# Patient Record
Sex: MALE | Race: WHITE | Employment: FULL TIME | URBAN - METROPOLITAN AREA
[De-identification: names, ages, dates, MRNs, and addresses within clinical notes are randomized per-mention and may not be internally consistent; named-entity substitution may affect disease eponyms.]

---

## 2017-01-05 ENCOUNTER — TELEPHONE (OUTPATIENT)
Dept: INTERNAL MEDICINE CLINIC | Age: 52
End: 2017-01-05

## 2017-01-05 NOTE — TELEPHONE ENCOUNTER
Ayla Winter called from Dr. Devi Patterson office and needs to change codes. She says she needs 66051 - 1 unit and 63349 4 units.   She would like a call back at [phone]

## 2017-01-16 NOTE — TELEPHONE ENCOUNTER
Luz Elena added CPT code 79924 with 1 visit retro 'd with effective starting date of 01/03/17 - end date 07/02/17   Received from [address] [place] [address] via fax on 01/13/17

## 2017-05-24 ENCOUNTER — TELEPHONE (OUTPATIENT)
Dept: INTERNAL MEDICINE CLINIC | Age: 52
End: 2017-05-24

## 2017-05-24 DIAGNOSIS — E29.1 MALE HYPOGONADISM: Primary | ICD-10-CM

## 2017-05-24 NOTE — TELEPHONE ENCOUNTER
----- Message from Francisca Hawley sent at 5/24/2017  1:24 PM EDT -----  Regarding: Dr. Noemi Shore  Pt would like a referral sent to Dr Marci Zimmerman for testosterone check [phone]. Appt set for 6/22/17. Best contact number is [phone].

## 2017-05-25 NOTE — TELEPHONE ENCOUNTER
[facility] [address]    Dr Farias Section     [address] [address] Endocrinology   Osteoporosis      [facility] [address]           p [phone]  f [phone]    Dx E29.1 secondary male hypogonadism         [id number]

## 2017-05-26 ENCOUNTER — TELEPHONE (OUTPATIENT)
Dept: INTERNAL MEDICINE CLINIC | Age: 52
End: 2017-05-26

## 2017-05-26 NOTE — TELEPHONE ENCOUNTER
Referral obtained and faxed to Dr Rich Casas' office at [phone]. Auth # [id number]  6 visits  5/25/17-5/25/18.

## 2017-09-13 DIAGNOSIS — F51.01 PRIMARY INSOMNIA: ICD-10-CM

## 2017-09-13 DIAGNOSIS — N52.9 ERECTILE DYSFUNCTION, UNSPECIFIED ERECTILE DYSFUNCTION TYPE: ICD-10-CM

## 2017-09-13 RX ORDER — SYRINGE-NEEDLE,INSULIN,0.5 ML 30 G X1/2"
SYRINGE, EMPTY DISPOSABLE MISCELLANEOUS
Qty: 30 SYRINGE | Refills: 3 | Status: SHIPPED | OUTPATIENT
Start: 2017-09-13 | End: 2018-02-27 | Stop reason: SDUPTHER

## 2017-09-13 RX ORDER — ZOLPIDEM TARTRATE 10 MG/1
10 TABLET ORAL
Qty: 90 TAB | Refills: 1 | Status: SHIPPED | OUTPATIENT
Start: 2017-09-13 | End: 2017-11-30

## 2017-09-27 RX ORDER — TRAZODONE HYDROCHLORIDE 50 MG/1
TABLET ORAL
Qty: 90 TAB | Refills: 1 | Status: SHIPPED | OUTPATIENT
Start: 2017-09-27 | End: 2018-03-26 | Stop reason: SDUPTHER

## 2017-09-27 RX ORDER — SIMVASTATIN 20 MG/1
TABLET, FILM COATED ORAL
Qty: 90 TAB | Refills: 1 | Status: SHIPPED | OUTPATIENT
Start: 2017-09-27 | End: 2017-11-30 | Stop reason: SINTOL

## 2017-10-19 ENCOUNTER — TELEPHONE (OUTPATIENT)
Dept: INTERNAL MEDICINE CLINIC | Age: 52
End: 2017-10-19

## 2017-10-19 NOTE — TELEPHONE ENCOUNTER
alprostadil (CAVERJECT IMPULSE) 20 mcg kit  Pleases call AL the Pharmacist at [facility] [phone] Press [phone] regarding this medication he was talking to fast for me to under stand what he was saying regarding this medication

## 2017-10-20 ENCOUNTER — DOCUMENTATION ONLY (OUTPATIENT)
Dept: INTERNAL MEDICINE CLINIC | Age: 52
End: 2017-10-20

## 2017-10-20 NOTE — PROGRESS NOTES
Pt picked up 1 rx for ambien, 1 rx for cavaject impulse, and 1 rx for insluin syringe ultra fine on 9/14/17

## 2017-11-13 ENCOUNTER — TELEPHONE (OUTPATIENT)
Dept: INTERNAL MEDICINE CLINIC | Age: 52
End: 2017-11-13

## 2017-11-13 DIAGNOSIS — Z01.00 EXAMINATION OF EYES AND VISION: Primary | ICD-10-CM

## 2017-11-13 DIAGNOSIS — H53.9 VISUAL CHANGES: ICD-10-CM

## 2017-11-22 RX ORDER — TELMISARTAN 40 MG/1
TABLET ORAL
Qty: 90 TAB | Refills: 3 | Status: SHIPPED | OUTPATIENT
Start: 2017-11-22 | End: 2018-11-17 | Stop reason: SDUPTHER

## 2017-11-30 ENCOUNTER — OFFICE VISIT (OUTPATIENT)
Dept: INTERNAL MEDICINE CLINIC | Age: 52
End: 2017-11-30

## 2017-11-30 VITALS
SYSTOLIC BLOOD PRESSURE: 120 MMHG | RESPIRATION RATE: 12 BRPM | BODY MASS INDEX: 31.56 KG/M2 | HEIGHT: 72 IN | WEIGHT: 233 LBS | TEMPERATURE: 97.9 F | DIASTOLIC BLOOD PRESSURE: 82 MMHG | HEART RATE: 73 BPM

## 2017-11-30 DIAGNOSIS — H01.00A BLEPHARITIS OF UPPER AND LOWER EYELIDS OF BOTH EYES, UNSPECIFIED TYPE: ICD-10-CM

## 2017-11-30 DIAGNOSIS — E78.5 HYPERLIPIDEMIA WITH TARGET LDL LESS THAN 130: ICD-10-CM

## 2017-11-30 DIAGNOSIS — I10 ESSENTIAL HYPERTENSION: ICD-10-CM

## 2017-11-30 DIAGNOSIS — Z23 ENCOUNTER FOR IMMUNIZATION: ICD-10-CM

## 2017-11-30 DIAGNOSIS — F43.10 PTSD (POST-TRAUMATIC STRESS DISORDER): ICD-10-CM

## 2017-11-30 DIAGNOSIS — H01.00B BLEPHARITIS OF UPPER AND LOWER EYELIDS OF BOTH EYES, UNSPECIFIED TYPE: ICD-10-CM

## 2017-11-30 DIAGNOSIS — I48.92 ATRIAL FLUTTER, UNSPECIFIED TYPE (HCC): ICD-10-CM

## 2017-11-30 DIAGNOSIS — Z00.00 WELL ADULT EXAM: Primary | ICD-10-CM

## 2017-11-30 RX ORDER — ESZOPICLONE 3 MG/1
3 TABLET, FILM COATED ORAL
Qty: 30 TAB | Refills: 0 | Status: SHIPPED | OUTPATIENT
Start: 2017-11-30 | End: 2018-07-16 | Stop reason: ALTCHOICE

## 2017-11-30 RX ORDER — BACITRACIN 500 [USP'U]/G
OINTMENT OPHTHALMIC 3 TIMES DAILY
COMMUNITY
Start: 2017-11-29 | End: 2018-07-16 | Stop reason: ALTCHOICE

## 2017-11-30 NOTE — PROGRESS NOTES
Joleen Muniz is a 47 y.o. male  Presenting for his annual checkup and health maintenance review and follow-up    Reports right lower buttock pain for 1 month. Lisa Lopez on it. No leg weakness  Saw Dr. Samira Burroughs for ELIZABETH. Using dental appliance  Seeing Dr. Andressa Shabazz for low T  Seeing cardiology for atrial flutter  Saw Dr. Rashida Rivas in optho and was dx with blepharitis and is using ointtment  Still reports some severe anxiety prior to going to sleep. Taking [place] and trazodone    [place]    Exercise: moderately active  Diet: generally follows a low fat low cholesterol diet  Health Maintenance   Topic Date Due    DTaP/Tdap/Td series (2 - Td) 10/01/2020    COLONOSCOPY  07/11/2026    Influenza Age 5 to Adult  Completed     Health Maintenance reviewed  Last digital rectal exam:  2015  Lab Results   Component Value Date/Time    Prostate Specific Ag 0.8 11/02/2016 12:09 PM    Prostate Specific Ag 0.7 10/24/2014 12:17 PM    Prostate Specific Ag 0.7 11/18/2013 09:29 AM    Prostate Specific Ag 0.4 06/08/2010 03:58 PM    Prostate Specific Ag 0.5 11/06/2009 08:31 AM    Prostate Specific Ag 0.5 07/14/2009 10:38 AM       Vaccinations reviewed  Immunization History   Administered Date(s) Administered    Influenza Vaccine 11/01/2012, 09/01/2013, 11/01/2015, 10/10/2016, 11/28/2017    Pneumococcal Vaccine (Unspecified Type) 06/30/2013    TDAP Vaccine 10/01/2010       Past Medical History:   Diagnosis Date    Atrial flutter ([address]     saw Dr. Orlando Moon. EP Ablation 7/30/13    Blepharitis of upper and lower eyelids of both eyes 11/30/2017    Dr. Rashida Rivas    Chronic right hip pain     DDD (degenerative disc disease), lumbar 6/2006    [employer] [place] combat injury.   MRI 5/2008 [facility] ED (erectile dysfunction)     Hearing loss, sensorineural     HTN (hypertension) 2006    Hyperlipidemia LDL goal < 130 2006    Insomnia     Lumbar disc disease with radiculopathy 12/2010    radiculopathy  ELIZABETH (obstructive sleep apnea) 4/09    Ft Dr. Newton Yan, CPAP    PTSD (post-traumatic stress disorder)     sees counselor    RLS (restless legs syndrome) 4/09    sleep only    Rosacea     Secondary male hypogonadism     MRI normal 8/2009. Dr. Mazin Flores      has a past surgical history that includes hernia repair; afib ablation (07/30/2013); and colonoscopy (N/A, 7/11/2016). Viagra [sildenafil]   Current Outpatient Prescriptions   Medication Sig    bacitracin ophthalmic ointment     MICARDIS 40 mg tablet TAKE 1 TABLET DAILY    traZODone (DESYREL) 50 mg tablet TAKE 1 TABLET NIGHTLY FOR INSOMNIA    zolpidem (AMBIEN) 10 mg tablet Take 1 Tab by mouth nightly as needed for Sleep.  alprostadil (CAVERJECT IMPULSE) 20 mcg kit 20 mcg by IntraCAVernosal route daily as needed. 3 month supply    Insulin Syringe-Needle U-100 (BD INSULIN SYRINGE ULTRA-FINE) 1/2 mL 30 gauge x 1/2\" syrg FOR USE WITH CAVERJET AS IN STRUCTCED BY DOCTOR    BD LUER-MARTHA SYRINGE 3 mL 21 x 1\" syrg     metoprolol (LOPRESSOR) 25 mg tablet Take 1 Tab by mouth two (2) times a day.  simvastatin (ZOCOR) 20 mg tablet Take 1 tablet by mouth nightly.  ASPIRIN (ASPIR-81 PO) Take  by mouth.  testosterone cypionate (DEPOTESTOTERONE CYPIONATE) 200 mg/mL injection by IntraMUSCular route once. Every 10 days    alprostadil (CAVERJECT) 20 mcg solr 20 mcg by IntraCAVernosal route daily as needed. Dispense with bacteriostatic water NDC [id number]    simvastatin (ZOCOR) 20 mg tablet TAKE 1 TABLET NIGHTLY     No current facility-administered medications for this visit. SOCIAL HX:  reports that he has never smoked. He has never used smokeless tobacco. He reports that he does not drink alcohol or use illicit drugs. FAMILY HX: family history includes Hypertension in his father. There is no history of Diabetes, Cancer, or Heart Disease.     Review of Systems - History obtained from the patient  General ROS: negative for - night sweats, weight gain or weight loss  Cardiovascular ROS: no chest pain, dyspnea on exertion, edema    Physical exam  Blood pressure 120/82, pulse 73, temperature 97.9 °F (36.6 °C), temperature source Oral, resp. rate 12, height 6' (1.829 m), weight 233 lb (105.7 kg). Wt Readings from Last 3 Encounters:   11/30/17 233 lb (105.7 kg)   11/02/16 240 lb (108.9 kg)   07/11/16 235 lb (106.6 kg)     He appears well, alert and oriented x 3, pleasant and cooperative. Vitals as noted. No rashes or significant lesions. Neck supple and free of adenopathy, or masses. No thyromegaly or carotid bruits. Cranial nerves normal. Lungs are clear to auscultation. Heart sounds are normal with no murmurs, clicks, gallops or rubs. Abdomen is soft, non- tender, with no masses or organomegaly. Extremities, peripheral pulses and reflexes are normal.  . RECTAL/PROSTATE EXAM: smooth and symmetric without nodules or tenderness. Skin is without rashes or suspicious lesions. Assessment and Plan:    1. Well adult exam  Overall doing fairly well. Check fasting labs. Prostate exam is normal.    Up-to-date on immunizations but will give hepatitis A and B vaccines because of upcoming travel  Repeat hepatitis A B in 1 month and then in 6 months. .    2. Blepharitis of upper and lower eyelids of both eyes, unspecified type  Using ointment per  ophthalmology    3. Hyperlipidemia with target LDL less than 130  Likely controlled with simvastatin    4. Essential hypertension  Controlled on current regimen. Continue current medications as written in chart    5. Atrial flutter, unspecified type [facility])  Currently asymptomatic  No scheduled follow-up with cardiology unless symptoms warrant. Insomnia and PTSD  Taking trazodone 50 mg and Ambien 10 mg. He has severe panic at night before going to bed. Will transition over to trazodone plus Lunesta for now. Could consider increasing trazodone as another option.   We will try to avoid doxepin because of history of arrhythmia. Also will try to avoid a benzodiazepine if possible, but could consider clonazepam to replace Lunesta and Ambien.       The patient is asked to make an attempt to improve diet and exercise patterns  Avoid tobacco products, excessive alcohol    Return for yearly wellness visits

## 2017-11-30 NOTE — PROGRESS NOTES
Presents for a PE. He is fasting. Will be going to [facility] and [facility] in the spring. Wonders about vaccination. Saw Dr Cornelio Campuzano yesterday for his eyes. On ointment. Discuss some depression. Some anxiety before bed.

## 2017-11-30 NOTE — MR AVS SNAPSHOT
Visit Information Date & Time Provider Department Dept. Phone Encounter #  
 11/30/2017 10:00 AM Thanh Hernandes MD [facility] [address] [id number] Upcoming Health Maintenance Date Due DTaP/Tdap/Td series (2 - Td) 10/1/2020 COLONOSCOPY 7/11/2026 Allergies as of 11/30/2017  Review Complete On: 11/30/2017 By: Thanh Hernandes MD  
  
 Severity Noted Reaction Type Reactions Viagra [Sildenafil]  07/14/2009    Other (comments) Visual changes Current Immunizations  Reviewed on 11/30/2017 Name Date Hep A and Hep B Vaccine  Incomplete Influenza Vaccine 11/28/2017, 10/10/2016, 11/1/2015, 9/1/2013, 11/1/2012 Pneumococcal Vaccine (Unspecified Type) 6/30/2013 TDAP Vaccine 10/1/2010 Reviewed by Melanie Smith on 11/30/2017 at 10:23 AM  
 Reviewed by Thanh Hernandes MD on 11/30/2017 at 10:45 AM  
You Were Diagnosed With   
  
 Codes Comments Well adult exam    -  Primary ICD-10-CM: Z00.00 ICD-9-CM: V70.0 Encounter for immunization     ICD-10-CM: Y78 ICD-9-CM: V03.89 Blepharitis of upper and lower eyelids of both eyes, unspecified type     ICD-10-CM: H01.001, H01.005, H01.004, H01.002 ICD-9-CM: 373.00 Hyperlipidemia with target LDL less than 130     ICD-10-CM: E78.5 ICD-9-CM: 272.4 Essential hypertension     ICD-10-CM: I10 
ICD-9-CM: 401.9 Atrial flutter, unspecified type ([facility] [address]     ICD-10-CM: I48.92 
ICD-9-CM: 427.32   
 PTSD (post-traumatic stress disorder)     ICD-10-CM: F43.10 ICD-9-CM: 309.81 Vitals BP Pulse Temp Resp Height(growth percentile) Weight(growth percentile) 120/82 (BP 1 Location: Left arm, BP Patient Position: Sitting) 73 97.9 °F (36.6 °C) (Oral) 12 6' (1.829 m) 233 lb (105.7 kg) BMI Smoking Status 31.6 kg/m2 Never Smoker Vitals History BMI and BSA Data Body Mass Index Body Surface Area  
 31.6 kg/m 2 2.32 m 2 Preferred Pharmacy Pharmacy Name Phone [address], [facility] [phone] Your Updated Medication List  
  
   
This list is accurate as of: 11/30/17 11:02 AM.  Always use your most recent med list.  
  
  
  
  
 alprostadil 20 mcg Kit Commonly known as:  CAVERJECT IMPULSE  
20 mcg by IntraCAVernosal route daily as needed. 3 month supply ASPIR-81 PO Take  by mouth.  
  
 bacitracin ophthalmic ointment BD LUER-MARTHA SYRINGE 3 mL 21 gauge x 1\" Syrg Generic drug:  Syringe with Needle (Disp)  
  
 eszopiclone 3 mg tablet Commonly known as:  Chase Buoy Take 1 Tab by mouth nightly. Max Daily Amount: 3 mg. Benny Hyde Insulin Syringe-Needle U-100 1/2 mL 30 gauge x 1/2\" Syrg Commonly known as:  BD INSULIN SYRINGE ULTRA-FINE  
FOR USE WITH CAVERJET AS IN STRUCTCED BY DOCTOR  
  
 metoprolol tartrate 25 mg tablet Commonly known as:  LOPRESSOR Take 1 Tab by mouth two (2) times a day. MICARDIS 40 mg tablet Generic drug:  telmisartan TAKE 1 TABLET DAILY  
  
 simvastatin 20 mg tablet Commonly known as:  ZOCOR Take 1 tablet by mouth nightly. testosterone cypionate 200 mg/mL injection Commonly known as:  DEPOTESTOTERONE CYPIONATE  
by IntraMUSCular route once. Every 10 days  
  
 traZODone 50 mg tablet Commonly known as:  DESYREL  
TAKE 1 TABLET NIGHTLY FOR INSOMNIA Prescriptions Printed Refills  
 eszopiclone (LUNESTA) 3 mg tablet 0 Sig: Take 1 Tab by mouth nightly. Max Daily Amount: 3 mg. Benny Hyde Class: Print Route: Oral  
  
We Performed the Following HEMOGLOBIN A1C WITH EAG [46991 CPT(R)] HEPATITIS A AND HEPATITIS B (HEPA-HEPB), ADULT DOSAGE, IM [88086 CPT(R)] LIPID PANEL [91484 CPT(R)] METABOLIC PANEL, COMPREHENSIVE [37200 CPT(R)] RI IMMUNIZ ADMIN,1 SINGLE/COMB VAC/TOXOID T8199102 CPT(R)] PSA W/ REFLX FREE PSA [86154 CPT(R)] Introducing \[facility]\"" & HEALTH SERVICES! Dear Renetta Clemons: Thank you for requesting a Vaybee account. Our records indicate that you already have an active Vaybee account. You can access your account anytime at [email or web address] Payment [email or web address]. [email or web address]/[email or web address] Payment Services Did you know that you can access your hospital and ER discharge instructions at any time in Vaybee? You can also review all of your test results from your hospital stay or ER visit. Additional Information If you have questions, please visit the Frequently Asked Questions section of the Vaybee website at [email or web address] Payment Services. [email or web address]/[email or web address] Payment Services/. Remember, Vaybee is NOT to be used for urgent needs. For medical emergencies, dial 911. Now available from your iPhone and Android! Please provide this summary of care documentation to your next provider. Your primary care clinician is listed as Deepak Chapa. If you have any questions after today's visit, please call [phone].

## 2017-12-01 LAB
ALBUMIN SERPL-MCNC: 4.5 G/DL (ref 3.5–5.5)
ALBUMIN/GLOB SERPL: 2.1 {RATIO} (ref 1.2–2.2)
ALP SERPL-CCNC: 32 IU/L (ref 39–117)
ALT SERPL-CCNC: 24 IU/L (ref 0–44)
AST SERPL-CCNC: 14 IU/L (ref 0–40)
BILIRUB SERPL-MCNC: 0.9 MG/DL (ref 0–1.2)
BUN SERPL-MCNC: 12 MG/DL (ref 6–24)
BUN/CREAT SERPL: 11 (ref 9–20)
CALCIUM SERPL-MCNC: 9.1 MG/DL (ref 8.7–10.2)
CHLORIDE SERPL-SCNC: 101 MMOL/L (ref 96–106)
CHOLEST SERPL-MCNC: 195 MG/DL (ref 100–199)
CO2 SERPL-SCNC: 24 MMOL/L (ref 18–29)
CREAT SERPL-MCNC: 1.08 MG/DL (ref 0.76–1.27)
EST. AVERAGE GLUCOSE BLD GHB EST-MCNC: 103 MG/DL
GFR SERPLBLD CREATININE-BSD FMLA CKD-EPI: 79 ML/MIN/1.73
GFR SERPLBLD CREATININE-BSD FMLA CKD-EPI: 91 ML/MIN/1.73
GLOBULIN SER CALC-MCNC: 2.1 G/DL (ref 1.5–4.5)
GLUCOSE SERPL-MCNC: 82 MG/DL (ref 65–99)
HBA1C MFR BLD: 5.2 % (ref 4.8–5.6)
HDLC SERPL-MCNC: 36 MG/DL
INTERPRETATION, 910389: NORMAL
LDLC SERPL CALC-MCNC: 143 MG/DL (ref 0–99)
POTASSIUM SERPL-SCNC: 5 MMOL/L (ref 3.5–5.2)
PROT SERPL-MCNC: 6.6 G/DL (ref 6–8.5)
PSA SERPL-MCNC: 0.6 NG/ML (ref 0–4)
REFLEX CRITERIA: NORMAL
SODIUM SERPL-SCNC: 139 MMOL/L (ref 134–144)
TRIGL SERPL-MCNC: 82 MG/DL (ref 0–149)
VLDLC SERPL CALC-MCNC: 16 MG/DL (ref 5–40)

## 2018-03-26 DIAGNOSIS — E78.5 HYPERLIPIDEMIA WITH TARGET LOW DENSITY LIPOPROTEIN (LDL) CHOLESTEROL LESS THAN 130 MG/DL: ICD-10-CM

## 2018-03-26 RX ORDER — TRAZODONE HYDROCHLORIDE 50 MG/1
TABLET ORAL
Qty: 90 TAB | Refills: 1 | Status: SHIPPED | OUTPATIENT
Start: 2018-03-26 | End: 2018-09-22 | Stop reason: SDUPTHER

## 2018-03-26 RX ORDER — SIMVASTATIN 20 MG/1
TABLET, FILM COATED ORAL
Qty: 90 TAB | Refills: 1 | Status: SHIPPED | OUTPATIENT
Start: 2018-03-26 | End: 2018-09-22 | Stop reason: SDUPTHER

## 2018-07-13 ENCOUNTER — TELEPHONE (OUTPATIENT)
Dept: INTERNAL MEDICINE CLINIC | Age: 53
End: 2018-07-13

## 2018-07-13 NOTE — TELEPHONE ENCOUNTER
Dr Lee Hallmark Stones  needs to see  [facility] next week for ? Gall Stones.  No [phone]  Would like a call back today or Monday Morning

## 2018-07-16 ENCOUNTER — OFFICE VISIT (OUTPATIENT)
Dept: INTERNAL MEDICINE CLINIC | Age: 53
End: 2018-07-16

## 2018-07-16 ENCOUNTER — HOSPITAL ENCOUNTER (OUTPATIENT)
Dept: ULTRASOUND IMAGING | Age: 53
Discharge: HOME | End: 2018-07-16
Attending: INTERNAL MEDICINE
Payer: OTHER GOVERNMENT

## 2018-07-16 VITALS
SYSTOLIC BLOOD PRESSURE: 124 MMHG | TEMPERATURE: 97.5 F | DIASTOLIC BLOOD PRESSURE: 89 MMHG | RESPIRATION RATE: 18 BRPM | HEIGHT: 72 IN | OXYGEN SATURATION: 97 % | BODY MASS INDEX: 31.02 KG/M2 | WEIGHT: 229 LBS | HEART RATE: 75 BPM

## 2018-07-16 DIAGNOSIS — F43.10 PTSD (POST-TRAUMATIC STRESS DISORDER): ICD-10-CM

## 2018-07-16 DIAGNOSIS — G47.00 INSOMNIA, UNSPECIFIED TYPE: ICD-10-CM

## 2018-07-16 DIAGNOSIS — R82.998 DARK URINE: ICD-10-CM

## 2018-07-16 DIAGNOSIS — R19.5 ACHOLIC STOOL: ICD-10-CM

## 2018-07-16 DIAGNOSIS — R10.13 EPIGASTRIC PAIN: ICD-10-CM

## 2018-07-16 DIAGNOSIS — R10.11 RUQ ABDOMINAL PAIN: Primary | ICD-10-CM

## 2018-07-16 DIAGNOSIS — R10.11 RUQ ABDOMINAL PAIN: ICD-10-CM

## 2018-07-16 DIAGNOSIS — I10 ESSENTIAL HYPERTENSION: ICD-10-CM

## 2018-07-16 DIAGNOSIS — I48.92 ATRIAL FLUTTER, UNSPECIFIED TYPE (HCC): ICD-10-CM

## 2018-07-16 PROCEDURE — 76705 ECHO EXAM OF ABDOMEN: CPT

## 2018-07-16 RX ORDER — ZOLPIDEM TARTRATE 10 MG/1
10 TABLET ORAL
Qty: 90 TAB | Refills: 1 | Status: SHIPPED | OUTPATIENT
Start: 2018-07-16 | End: 2019-06-25 | Stop reason: SDUPTHER

## 2018-07-16 RX ORDER — ZOLPIDEM TARTRATE 10 MG/1
10 TABLET ORAL
Qty: 30 TAB | Refills: 0 | Status: SHIPPED | OUTPATIENT
Start: 2018-07-16 | End: 2018-07-16 | Stop reason: SDUPTHER

## 2018-07-16 RX ORDER — ZOLPIDEM TARTRATE 10 MG/1
10 TABLET ORAL
COMMUNITY
End: 2018-07-16 | Stop reason: SDUPTHER

## 2018-07-16 NOTE — MR AVS SNAPSHOT
Jose Carissa 
 
 
 [address] [facility] [address] [facility] 
[phone] Patient: Jose Avila MRN: [id number] :1965 Visit Information Date & Time Provider Department Dept. Phone Encounter #  
 2018 11:30 AM Jordan Heart MD [facility] [address] [id number] Upcoming Health Maintenance Date Due Influenza Age 5 to Adult 2018 DTaP/Tdap/Td series (2 - Td) 10/1/2020 COLONOSCOPY 2026 Allergies as of 2018  Review Complete On: 2018 By: Briana Olivas LPN Severity Noted Reaction Type Reactions Viagra [Sildenafil]  2009    Other (comments) Visual changes Current Immunizations  Reviewed on 2017 Name Date Hep A and Hep B Vaccine 2017 11:13 AM  
 Influenza Vaccine 2017, 10/10/2016, 2015, 2013, 2012 Pneumococcal Vaccine (Unspecified Type) 2013 TDAP Vaccine 10/1/2010 Not reviewed this visit You Were Diagnosed With   
  
 Codes Comments RUQ abdominal pain    -  Primary ICD-10-CM: R10.11 ICD-9-CM: 789.01 Acholic stool     NSN-02-MB: R19.5 ICD-9-CM: 787.7 Dark urine     ICD-10-CM: R82.99 
ICD-9-CM: 791.9 Atrial flutter, unspecified type ([facility] [address]     ICD-10-CM: I48.92 
ICD-9-CM: 427.32 Insomnia, unspecified type     ICD-10-CM: G47.00 ICD-9-CM: 780.52 PTSD (post-traumatic stress disorder)     ICD-10-CM: F43.10 ICD-9-CM: 309.81 Essential hypertension     ICD-10-CM: I10 
ICD-9-CM: 401.9 Vitals BP Pulse Temp Resp Height(growth percentile) Weight(growth percentile) 124/89 (BP 1 Location: Left arm, BP Patient Position: Sitting) 75 97.5 °F (36.4 °C) (Oral) 18 6' (1.829 m) 229 lb (103.9 kg) SpO2 BMI Smoking Status 97% 31.06 kg/m2 Never Smoker Vitals History BMI and BSA Data Body Mass Index Body Surface Area 31.06 kg/m 2 2.3 m 2 Preferred Pharmacy Pharmacy Name Phone [id number] [address], [facility] [phone] Your Updated Medication List  
  
   
This list is accurate as of 7/16/18 12:13 PM.  Always use your most recent med list.  
  
  
  
  
 alprostadil 20 mcg Kit Commonly known as:  CAVERJECT IMPULSE  
20 mcg by IntraCAVernosal route daily as needed. 3 month supply ASPIR-81 PO Take 81 mg by mouth daily. BD INSULIN SYRINGE ULTRA-FINE 1/2 mL 30 gauge x 1/2\" Syrg Generic drug:  Insulin Syringe-Needle U-100 FOR USE WITH REMY JET AS DIRECTED BY DOCTOR  
  
 BD LUER-MARTHA SYRINGE 3 mL 21 gauge x 1\" Syrg Generic drug:  Syringe with Needle (Disp) metoprolol tartrate 25 mg tablet Commonly known as:  LOPRESSOR Take 1 Tab by mouth two (2) times a day. MICARDIS 40 mg tablet Generic drug:  telmisartan TAKE 1 TABLET DAILY  
  
 simvastatin 20 mg tablet Commonly known as:  ZOCOR  
TAKE 1 TABLET NIGHTLY  
  
 testosterone cypionate 200 mg/mL injection Commonly known as:  DEPOTESTOTERONE CYPIONATE  
by IntraMUSCular route once. Every 10 days  
  
 traZODone 50 mg tablet Commonly known as:  DESYREL  
TAKE 1 TABLET NIGHTLY FOR INSOMNIA  
  
 zolpidem 10 mg tablet Commonly known as:  AMBIEN Take 1 Tab by mouth nightly as needed for Sleep. Max Daily Amount: 10 mg.  
  
  
  
  
Prescriptions Printed Refills  
 zolpidem (AMBIEN) 10 mg tablet 1 Sig: Take 1 Tab by mouth nightly as needed for Sleep. Max Daily Amount: 10 mg.  
 Class: Print Route: Oral  
  
We Performed the Following CBC W/O DIFF [79884 CPT(R)] LIPASE [id number] CPT(R)] METABOLIC PANEL, COMPREHENSIVE [00530 CPT(R)] REFERRAL TO GENERAL SURGERY [REF27 Custom] To-Do List   
 07/16/2018 Imaging:  US ABD COMP Referral Information Referral ID Referred By Referred To  
  
 [id number] [address] Cynthia Rosaliejayshree [address] Visits Status Start Date End Date 1 New Request 7/16/18 7/16/19 If your referral has a status of pending review or denied, additional information will be sent to support the outcome of this decision. Patient Instructions Low-Fat Diet for Gallbladder Disease: Care Instructions Your Care Instructions When you eat, the gallbladder releases bile, which helps you digest the fat in food. If you have an inflamed gallbladder, this may cause pain. A low-fat diet may give your gallbladder a rest so you can start to heal. Your doctor and dietitian can help you make an eating plan that does not irritate your digestive system. Always talk with your doctor or dietitian before you make changes in your diet. Follow-up care is a key part of your treatment and safety. Be sure to make and go to all appointments, and call your doctor if you are having problems. It's also a good idea to know your test results and keep a list of the medicines you take. How can you care for yourself at home? · Eat many small meals and snacks each day instead of three large meals. · Choose lean meats. ¨ Eat no more than 5 to 6½ ounces of meat a day. ¨ Cut off all fat you can see. ¨ Eat chicken and turkey without the skin. ¨ Many types of fish, such as salmon, lake trout, tuna, and herring, provide healthy omega-3 fat. But, avoid fish canned in oil, such as sardines in olive oil. ¨ Bake, broil, or grill meats, poultry, or fish instead of frying them in butter or fat. · Drink or eat nonfat or low-fat milk, yogurt, cheese, or other milk products each day. ¨ Read the labels on cheeses, and choose those with less than 5 grams of fat an ounce. ¨ Try fat-free sour cream, cream cheese, or yogurt. ¨ Avoid cream soups and cream sauces on pasta. ¨ Eat low-fat ice cream, frozen yogurt, or sorbet. Avoid regular ice cream. 
· Eat whole-grain cereals, breads, crackers, rice, or pasta.  Avoid high-fat foods such as croissants, scones, biscuits, waffles, doughnuts, muffins, granola, and high-fat breads. · Flavor your foods with herbs and spices (such as basil, tarragon, or mint), fat-free sauces, or lemon juice instead of butter. You can also use butter substitutes, fat-free mayonnaise, or fat-free dressing. · Try applesauce, prune puree, or mashed bananas to replace some or all of the fat when you bake. · Limit fats and oils, such as butter, margarine, mayonnaise, and salad dressing, to no more than 1 tablespoon a meal. 
· Avoid high-fat foods, such as: 
¨ Chocolate, whole milk, ice cream, and processed cheese. ¨ Fried or buttered foods. ¨ Sausage, salami, and osuna. ¨ Cinnamon rolls, cakes, pies, cookies, and other pastries. ¨ Prepared snack foods, such as potato chips, nut and granola bars, and mixed nuts. ¨ Coconut and avocado. · Learn how to read food labels for serving sizes and ingredients. Fast-food and convenience-food meals often have lots of fat. Where can you learn more? Go to http://kristi-jason.info/. Enter U845 in the search box to learn more about \"Low-Fat Diet for Gallbladder Disease: Care Instructions. \" Current as of: May 12, 2017 Content Version: 11.7 © 7853-3081 [employer] Care instructions adapted under license by [employer] (which disclaims liability or warranty for this information). If you have questions about a medical condition or this instruction, always ask your healthcare professional. [place] [address] any warranty or liability for your use of this information. Introducing [facility] & HEALTH SERVICES! Dear Ginny Limon: 
Thank you for requesting a CarDomain Network account. Our records indicate that you already have an active [employer] account. You can access your account anytime at [email or web address] Did you know that you can access your hospital and ER discharge instructions at any time in WhatsApp? You can also review all of your test results from your hospital stay or ER visit. Additional Information If you have questions, please visit the Frequently Asked Questions section of the WhatsApp website at [email or web address]/Image Space [email or web address]/. Remember, WhatsApp is NOT to be used for urgent needs. For medical emergencies, dial 911. Now available from your iPhone and Android! Please provide this summary of care documentation to your next provider. Your primary care clinician is listed as Joe Friedman. If you have any questions after today's visit, please call [phone].

## 2018-07-16 NOTE — PATIENT INSTRUCTIONS
Low-Fat Diet for Gallbladder Disease: Care Instructions Your Care Instructions When you eat, the gallbladder releases bile, which helps you digest the fat in food. If you have an inflamed gallbladder, this may cause pain. A low-fat diet may give your gallbladder a rest so you can start to heal. Your doctor and dietitian can help you make an eating plan that does not irritate your digestive system. Always talk with your doctor or dietitian before you make changes in your diet. Follow-up care is a key part of your treatment and safety. Be sure to make and go to all appointments, and call your doctor if you are having problems. It's also a good idea to know your test results and keep a list of the medicines you take. How can you care for yourself at home? · Eat many small meals and snacks each day instead of three large meals. · Choose lean meats. ¨ Eat no more than 5 to 6½ ounces of meat a day. ¨ Cut off all fat you can see. ¨ Eat chicken and turkey without the skin. ¨ Many types of fish, such as salmon, lake trout, tuna, and herring, provide healthy omega-3 fat. But, avoid fish canned in oil, such as sardines in olive oil. ¨ Bake, broil, or grill meats, poultry, or fish instead of frying them in butter or fat. · Drink or eat nonfat or low-fat milk, yogurt, cheese, or other milk products each day. ¨ Read the labels on cheeses, and choose those with less than 5 grams of fat an ounce. ¨ Try fat-free sour cream, cream cheese, or yogurt. ¨ Avoid cream soups and cream sauces on pasta. ¨ Eat low-fat ice cream, frozen yogurt, or sorbet. Avoid regular ice cream. 
· Eat whole-grain cereals, breads, crackers, rice, or pasta. Avoid high-fat foods such as croissants, scones, biscuits, waffles, doughnuts, muffins, granola, and high-fat breads. · Flavor your foods with herbs and spices (such as basil, tarragon, or mint), fat-free sauces, or lemon juice instead of butter.  You can also use butter substitutes, fat-free mayonnaise, or fat-free dressing. · Try applesauce, prune puree, or mashed bananas to replace some or all of the fat when you bake. · Limit fats and oils, such as butter, margarine, mayonnaise, and salad dressing, to no more than 1 tablespoon a meal. 
· Avoid high-fat foods, such as: 
¨ Chocolate, whole milk, ice cream, and processed cheese. ¨ Fried or buttered foods. ¨ Sausage, salami, and osuna. ¨ Cinnamon rolls, cakes, pies, cookies, and other pastries. ¨ Prepared snack foods, such as potato chips, nut and granola bars, and mixed nuts. ¨ Coconut and avocado. · Learn how to read food labels for serving sizes and ingredients. Fast-food and convenience-food meals often have lots of fat. Where can you learn more? Go to http://kristi-jason.info/. Enter N164 in the search box to learn more about \"Low-Fat Diet for Gallbladder Disease: Care Instructions. \" Current as of: May 12, 2017 Content Version: 11.7 © 4015-5280 [employer], [employer]. Care instructions adapted under license by [employer] (which disclaims liability or warranty for this information). If you have questions about a medical condition or this instruction, always ask your healthcare professional. [place] [address] any warranty or liability for your use of this information.

## 2018-07-17 LAB
ALBUMIN SERPL-MCNC: 4.5 G/DL (ref 3.5–5.5)
ALBUMIN/GLOB SERPL: 2 {RATIO} (ref 1.2–2.2)
ALP SERPL-CCNC: 68 IU/L (ref 39–117)
ALT SERPL-CCNC: 220 IU/L (ref 0–44)
AST SERPL-CCNC: 70 IU/L (ref 0–40)
BILIRUB SERPL-MCNC: 1 MG/DL (ref 0–1.2)
BUN SERPL-MCNC: 8 MG/DL (ref 6–24)
BUN/CREAT SERPL: 8 (ref 9–20)
CALCIUM SERPL-MCNC: 9.1 MG/DL (ref 8.7–10.2)
CHLORIDE SERPL-SCNC: 99 MMOL/L (ref 96–106)
CO2 SERPL-SCNC: 25 MMOL/L (ref 20–29)
CREAT SERPL-MCNC: 1.03 MG/DL (ref 0.76–1.27)
ERYTHROCYTE [DISTWIDTH] IN BLOOD BY AUTOMATED COUNT: 13.9 % (ref 12.3–15.4)
GLOBULIN SER CALC-MCNC: 2.2 G/DL (ref 1.5–4.5)
GLUCOSE SERPL-MCNC: 89 MG/DL (ref 65–99)
HCT VFR BLD AUTO: 48.9 % (ref 37.5–51)
HGB BLD-MCNC: 16.5 G/DL (ref 13–17.7)
LIPASE SERPL-CCNC: 47 U/L (ref 13–78)
MCH RBC QN AUTO: 31.3 PG (ref 26.6–33)
MCHC RBC AUTO-ENTMCNC: 33.7 G/DL (ref 31.5–35.7)
MCV RBC AUTO: 93 FL (ref 79–97)
PLATELET # BLD AUTO: 216 X10E3/UL (ref 150–379)
POTASSIUM SERPL-SCNC: 4.4 MMOL/L (ref 3.5–5.2)
PROT SERPL-MCNC: 6.7 G/DL (ref 6–8.5)
RBC # BLD AUTO: 5.27 X10E6/UL (ref 4.14–5.8)
SODIUM SERPL-SCNC: 141 MMOL/L (ref 134–144)
WBC # BLD AUTO: 6.6 X10E3/UL (ref 3.4–10.8)

## 2018-07-17 NOTE — PROGRESS NOTES
HISTORY OF PRESENT ILLNESS Presents with abdominal pains in epigastric and right sided upper region for 4 day(s). Began in the evening and was associated with nausea, light colored stool, dark urine. S/s improved after a few hours. Has milder symptoms last night. He is eating small meals, low fat. No pain with eating Stool normal today. No fever or chills Review of Systems All other systems reviewed and are negative, except as noted in HPI Past Medical and Surgical History 
 has a past medical history of Atrial flutter ([address] Blepharitis of upper and lower eyelids of both eyes (11/30/2017); Chronic right hip pain; DDD (degenerative disc disease), lumbar (6/2006); ED (erectile dysfunction); Hearing loss, sensorineural; HTN (hypertension) (2006); Hyperlipidemia LDL goal < 130 (2006); Insomnia; Lumbar disc disease with radiculopathy (12/2010); ELIZABETH (obstructive sleep apnea) (4/09); PTSD (post-traumatic stress disorder); RLS (restless legs syndrome) (4/09); Rosacea; and Secondary male hypogonadism. He also has no past medical history of Diabetes ([address] or Malignant hyperthermia due to anesthesia. has a past surgical history that includes hx hernia repair; hx afib ablation (07/30/2013); and colonoscopy (N/A, 7/11/2016). reports that he has never smoked. He has never used smokeless tobacco. He reports that he does not drink alcohol or use illicit drugs. family history includes Hypertension in his father. There is no history of Diabetes, Cancer, or Heart Disease. Physical Exam  
Nursing note and vitals reviewed. Blood pressure 124/89, pulse 75, temperature 97.5 °F (36.4 °C), temperature source Oral, resp. rate 18, height 6' (1.829 m), weight 229 lb (103.9 kg), SpO2 97 %. Constitutional: In no distress. Eyes: Conjunctivae are normal. 
HEENT:  No LAD or thyromegaly Cardiovascular: Normal rate. regular rhythm. No murmurs No edema Pulmonary/Chest: Effort normal. clear to ausculation blaterally Musculoskeletal:  no edema. Abd: soft, NT, no masses, neg mcmurtry sign Neurological: Alert and oriented. Grossly intact cranial nerves and motor function. Skin: No rash noted. Psychiatric: Normal mood and affect. Behavior is normal.  
 
ASSESSMENT and PLAN Diagnoses and all orders for this visit: 1. RUQ abdominal pain 2. Epigastric pain 3. Acholic stool 4. Dark urine Currently asymptomatic Very suspicious for gallbladder disease. No evidence of cholecystitis on exam or by symptoms today, however. Check ultrasound and labs. Likely will refer to general surgery. Report the emergency room if significant abdominal pain associated with fevers or persistent abdominal pain. If gallbladder appears normal on ultrasound and labs are normal, will order HIDA scan. -     METABOLIC PANEL, COMPREHENSIVE 
-     CBC W/O DIFF 
-     LIPASE 
-     [facility] General Surgery [facility] 5. Atrial flutter, unspecified type ([address] Currently asymptomatic 6. Insomnia, unspecified type Controlled on current regimen. Continue current medications as written in chart. 
-     zolpidem (AMBIEN) 10 mg tablet; Take 1 Tab by mouth nightly as needed for Sleep. Max Daily Amount: 10 mg. 
 
7. PTSD (post-traumatic stress disorder) 8. Essential hypertension Controlled on current regimen. Continue current medications as written in chart 
 
lab results and schedule of future lab studies reviewed with patient 
reviewed medications and side effects in detail Return to clinic for further evaluation if new symptoms develop or if current symptoms worsen or fail to resolve. Patient Instructions Low-Fat Diet for Gallbladder Disease: Care Instructions Your Care Instructions When you eat, the gallbladder releases bile, which helps you digest the fat in food. If you have an inflamed gallbladder, this may cause pain.  A low-fat diet may give your gallbladder a rest so you can start to heal. Your doctor and dietitian can help you make an eating plan that does not irritate your digestive system. Always talk with your doctor or dietitian before you make changes in your diet. Follow-up care is a key part of your treatment and safety. Be sure to make and go to all appointments, and call your doctor if you are having problems. It's also a good idea to know your test results and keep a list of the medicines you take. How can you care for yourself at home? · Eat many small meals and snacks each day instead of three large meals. · Choose lean meats. ¨ Eat no more than 5 to 6½ ounces of meat a day. ¨ Cut off all fat you can see. ¨ Eat chicken and turkey without the skin. ¨ Many types of fish, such as salmon, lake trout, tuna, and herring, provide healthy omega-3 fat. But, avoid fish canned in oil, such as sardines in olive oil. ¨ Bake, broil, or grill meats, poultry, or fish instead of frying them in butter or fat. · Drink or eat nonfat or low-fat milk, yogurt, cheese, or other milk products each day. ¨ Read the labels on cheeses, and choose those with less than 5 grams of fat an ounce. ¨ Try fat-free sour cream, cream cheese, or yogurt. ¨ Avoid cream soups and cream sauces on pasta. ¨ Eat low-fat ice cream, frozen yogurt, or sorbet. Avoid regular ice cream. 
· Eat whole-grain cereals, breads, crackers, rice, or pasta. Avoid high-fat foods such as croissants, scones, biscuits, waffles, doughnuts, muffins, granola, and high-fat breads. · Flavor your foods with herbs and spices (such as basil, tarragon, or mint), fat-free sauces, or lemon juice instead of butter. You can also use butter substitutes, fat-free mayonnaise, or fat-free dressing. · Try applesauce, prune puree, or mashed bananas to replace some or all of the fat when you bake.  
· Limit fats and oils, such as butter, margarine, mayonnaise, and salad dressing, to no more than 1 tablespoon a meal. 
· Avoid high-fat foods, such as: 
¨ Chocolate, whole milk, ice cream, and processed cheese. ¨ Fried or buttered foods. ¨ Sausage, salami, and osuna. ¨ Cinnamon rolls, cakes, pies, cookies, and other pastries. ¨ Prepared snack foods, such as potato chips, nut and granola bars, and mixed nuts. ¨ Coconut and avocado. · Learn how to read food labels for serving sizes and ingredients. Fast-food and convenience-food meals often have lots of fat. Where can you learn more? Go to http://kristi-jason.info/. Enter W561 in the search box to learn more about \"Low-Fat Diet for Gallbladder Disease: Care Instructions. \" Current as of: May 12, 2017 Content Version: 11.7 © 1978-0270 [employer], [employer]. Care instructions adapted under license by [employer] (which disclaims liability or warranty for this information). If you have questions about a medical condition or this instruction, always ask your healthcare professional. [address] any warranty or liability for your use of this information.

## 2018-07-23 DIAGNOSIS — R74.8 LIVER ENZYME ELEVATION: Primary | ICD-10-CM

## 2018-07-27 LAB
ALBUMIN SERPL-MCNC: 4.4 G/DL (ref 3.5–5.5)
ALP SERPL-CCNC: 46 IU/L (ref 39–117)
ALT SERPL-CCNC: 38 IU/L (ref 0–44)
AST SERPL-CCNC: 19 IU/L (ref 0–40)
BILIRUB DIRECT SERPL-MCNC: 0.25 MG/DL (ref 0–0.4)
BILIRUB SERPL-MCNC: 0.9 MG/DL (ref 0–1.2)
CK SERPL-CCNC: 108 U/L (ref 24–204)
GGT SERPL-CCNC: 82 IU/L (ref 0–65)
HAV IGM SERPL QL IA: NEGATIVE
HBV CORE IGM SERPL QL IA: NEGATIVE
HBV SURFACE AG SERPL QL IA: NEGATIVE
HCV AB S/CO SERPL IA: <0.1 S/CO RATIO (ref 0–0.9)
PROT SERPL-MCNC: 6.8 G/DL (ref 6–8.5)

## 2018-08-19 RX ORDER — METOPROLOL TARTRATE 25 MG/1
25 TABLET, FILM COATED ORAL 2 TIMES DAILY
Qty: 180 TAB | Refills: 1 | Status: SHIPPED | OUTPATIENT
Start: 2018-08-19 | End: 2018-12-06 | Stop reason: SDUPTHER

## 2018-09-22 DIAGNOSIS — E78.5 HYPERLIPIDEMIA WITH TARGET LOW DENSITY LIPOPROTEIN (LDL) CHOLESTEROL LESS THAN 130 MG/DL: ICD-10-CM

## 2018-09-22 RX ORDER — TRAZODONE HYDROCHLORIDE 50 MG/1
TABLET ORAL
Qty: 90 TAB | Refills: 1 | Status: SHIPPED | OUTPATIENT
Start: 2018-09-22 | End: 2019-03-21 | Stop reason: SDUPTHER

## 2018-09-22 RX ORDER — SIMVASTATIN 20 MG/1
TABLET, FILM COATED ORAL
Qty: 90 TAB | Refills: 1 | Status: SHIPPED | OUTPATIENT
Start: 2018-09-22 | End: 2018-12-06 | Stop reason: SDUPTHER

## 2018-11-12 ENCOUNTER — TELEPHONE (OUTPATIENT)
Dept: INTERNAL MEDICINE CLINIC | Age: 53
End: 2018-11-12

## 2018-11-12 NOTE — TELEPHONE ENCOUNTER
----- Message from Kenny Kilpatrick sent at 11/12/2018  3:42 PM EST -----  Regarding: Dr. Arsenio Snellen (wife) is requesting a referral to visit Endocrinologist Dr. Phyllis Gates [phone] and fax [phone]. The appt is scheduled for Wednesday 11/28/18 at 0230pm. Best contact is [phone].

## 2018-11-13 RX ORDER — ALPROSTADIL 20.5 UG/ML
INJECTION, POWDER, LYOPHILIZED, FOR SOLUTION INTRACAVERNOUS
Qty: 6 EACH | Refills: 11 | Status: SHIPPED | OUTPATIENT
Start: 2018-11-13 | End: 2019-10-20 | Stop reason: SDUPTHER

## 2018-11-18 RX ORDER — TELMISARTAN 40 MG/1
TABLET ORAL
Qty: 90 TAB | Refills: 3 | Status: SHIPPED | OUTPATIENT
Start: 2018-11-18 | End: 2019-11-12 | Stop reason: SDUPTHER

## 2018-11-26 ENCOUNTER — TELEPHONE (OUTPATIENT)
Dept: INTERNAL MEDICINE CLINIC | Age: 53
End: 2018-11-26

## 2018-11-26 DIAGNOSIS — E29.1 MALE HYPOGONADISM: Primary | ICD-10-CM

## 2018-11-26 NOTE — TELEPHONE ENCOUNTER
----- Message from Saintclair Jacobus sent at 11/26/2018  9:23 AM EST -----  Regarding: Dr. Catherine Morales (wife) is requesting a call back in regards to a referral to endocrinologist Dr. Shannon Jensen appt. on Wednesday 11/28/18 @ 2:30pm for the pt.  Best contact: [phone]

## 2018-11-26 NOTE — TELEPHONE ENCOUNTER
Referral has been obtained and faxed to Dr Michael Chew' office at [phone].  Auth # [id number]  4 visits  11/22/18-11/22/19

## 2018-12-06 ENCOUNTER — OFFICE VISIT (OUTPATIENT)
Dept: INTERNAL MEDICINE CLINIC | Age: 53
End: 2018-12-06

## 2018-12-06 VITALS
HEIGHT: 72 IN | RESPIRATION RATE: 18 BRPM | BODY MASS INDEX: 30.66 KG/M2 | WEIGHT: 226.4 LBS | DIASTOLIC BLOOD PRESSURE: 74 MMHG | HEART RATE: 63 BPM | TEMPERATURE: 97.5 F | SYSTOLIC BLOOD PRESSURE: 110 MMHG

## 2018-12-06 DIAGNOSIS — I48.92 ATRIAL FLUTTER, UNSPECIFIED TYPE (HCC): ICD-10-CM

## 2018-12-06 DIAGNOSIS — E78.5 HYPERLIPIDEMIA WITH TARGET LOW DENSITY LIPOPROTEIN (LDL) CHOLESTEROL LESS THAN 130 MG/DL: ICD-10-CM

## 2018-12-06 DIAGNOSIS — Z00.00 WELL ADULT HEALTH CHECK: Primary | ICD-10-CM

## 2018-12-06 DIAGNOSIS — L21.9 SEBORRHEIC DERMATITIS: ICD-10-CM

## 2018-12-06 DIAGNOSIS — I10 ESSENTIAL HYPERTENSION: ICD-10-CM

## 2018-12-06 RX ORDER — ZOSTER VACCINE RECOMBINANT, ADJUVANTED 50 MCG/0.5
0.5 KIT INTRAMUSCULAR ONCE
Qty: 0.5 ML | Refills: 1 | Status: SHIPPED | OUTPATIENT
Start: 2018-12-06 | End: 2018-12-06

## 2018-12-06 RX ORDER — METOPROLOL TARTRATE 25 MG/1
25 TABLET, FILM COATED ORAL 2 TIMES DAILY
Qty: 180 TAB | Refills: 3 | Status: SHIPPED | COMMUNITY
Start: 2018-12-06 | End: 2019-03-21 | Stop reason: SDUPTHER

## 2018-12-06 RX ORDER — SIMVASTATIN 20 MG/1
TABLET, FILM COATED ORAL
Qty: 90 TAB | Refills: 3 | Status: SHIPPED | COMMUNITY
Start: 2018-12-06 | End: 2020-01-09 | Stop reason: ALTCHOICE

## 2018-12-06 RX ORDER — KETOCONAZOLE 20 MG/G
CREAM TOPICAL 2 TIMES DAILY
Qty: 30 G | Refills: 1 | Status: SHIPPED | OUTPATIENT
Start: 2018-12-06 | End: 2019-01-03

## 2018-12-06 NOTE — PROGRESS NOTES
Trevon Colorado is a 47 y.o. male Fasting Chief Complaint Patient presents with  Complete Physical  
 
 
1. Have you been to the ER, urgent care clinic since your last visit? Hospitalized since your last visit? No 
M 
2. Have you seen or consulted any other health care providers outside of the [address] [place] [address] since your last visit? Include any pap smears or colon screening. No 
 
 
Visit Vitals /74 (BP 1 Location: Left arm, BP Patient Position: Sitting) Pulse 63 Temp 97.5 °F (36.4 °C) (Oral) Resp 18 Ht 6' (1.829 m) Wt 226 lb 6.4 oz (102.7 kg) BMI 30.71 kg/m² Health Maintenance Due Topic Date Due  Shingrix Vaccine Age 50> (1 of 2) 04/17/2016 Medication Reconciliation completed, changes noted.   Please  Update medication list.

## 2018-12-06 NOTE — PATIENT INSTRUCTIONS
Body Mass Index: Care Instructions Your Care Instructions Body mass index (BMI) can help you see if your weight is raising your risk for health problems. It uses a formula to compare how much you weigh with how tall you are. · A BMI lower than 18.5 is considered underweight. · A BMI between 18.5 and 24.9 is considered healthy. · A BMI between 25 and 29.9 is considered overweight. A BMI of 30 or higher is considered obese. If your BMI is in the normal range, it means that you have a lower risk for weight-related health problems. If your BMI is in the overweight or obese range, you may be at increased risk for weight-related health problems, such as high blood pressure, heart disease, stroke, arthritis or joint pain, and diabetes. If your BMI is in the underweight range, you may be at increased risk for health problems such as fatigue, lower protection (immunity) against illness, muscle loss, bone loss, hair loss, and hormone problems. BMI is just one measure of your risk for weight-related health problems. You may be at higher risk for health problems if you are not active, you eat an unhealthy diet, or you drink too much alcohol or use tobacco products. Follow-up care is a key part of your treatment and safety. Be sure to make and go to all appointments, and call your doctor if you are having problems. It's also a good idea to know your test results and keep a list of the medicines you take. How can you care for yourself at home? · Practice healthy eating habits. This includes eating plenty of fruits, vegetables, whole grains, lean protein, and low-fat dairy. · If your doctor recommends it, get more exercise. Walking is a good choice. Bit by bit, increase the amount you walk every day. Try for at least 30 minutes on most days of the week. · Do not smoke. Smoking can increase your risk for health problems.  If you need help quitting, talk to your doctor about stop-smoking programs and medicines. These can increase your chances of quitting for good. · Limit alcohol to 2 drinks a day for men and 1 drink a day for women. Too much alcohol can cause health problems. If you have a BMI higher than 25 · Your doctor may do other tests to check your risk for weight-related health problems. This may include measuring the distance around your waist. A waist measurement of more than 40 inches in men or 35 inches in women can increase the risk of weight-related health problems. · Talk with your doctor about steps you can take to stay healthy or improve your health. You may need to make lifestyle changes to lose weight and stay healthy, such as changing your diet and getting regular exercise. If you have a BMI lower than 18.5 · Your doctor may do other tests to check your risk for health problems. · Talk with your doctor about steps you can take to stay healthy or improve your health. You may need to make lifestyle changes to gain or maintain weight and stay healthy, such as getting more healthy foods in your diet and doing exercises to build muscle. Where can you learn more? Go to http://kristi-jaosn.info/. Enter S176 in the search box to learn more about \"Body Mass Index: Care Instructions. \" Current as of: October 13, 2016 Content Version: 11.4 © 7229-6426 [employer]. Care instructions adapted under license by [employer] (which disclaims liability or warranty for this information). If you have questions about a medical condition or this instruction, always ask your healthcare professional. [address] any warranty or liability for your use of this information.

## 2018-12-06 NOTE — PROGRESS NOTES
Severino Sandoval is a 47 y.o. male Presenting for his annual checkup and health maintenance review and follow-up Exercise: moderately active Diet: generally follows a low fat low cholesterol diet Health Maintenance Topic Date Due  Shingrix Vaccine Age 50> (1 of 2) 04/17/2016  
 DTaP/Tdap/Td series (2 - Td) 10/01/2020  COLONOSCOPY  07/11/2026  Influenza Age 5 to Adult  Completed Health Maintenance reviewed Last digital rectal exam:  none Lab Results Component Value Date/Time  
 Prostate Specific Ag 0.6 11/30/2017 12:17 PM  
 Prostate Specific Ag 0.8 11/02/2016 12:09 PM  
 Prostate Specific Ag 0.7 10/24/2014 12:17 PM  
 Prostate Specific Ag 0.4 06/08/2010 03:58 PM  
 Prostate Specific Ag 0.5 11/06/2009 08:31 AM  
 Prostate Specific Ag 0.5 07/14/2009 10:38 AM  
 
 
Vaccinations reviewed Immunization History Administered Date(s) Administered  Hep A and Hep B Vaccine 11/30/2017  Influenza Vaccine 11/01/2012, 09/01/2013, 11/01/2015, 10/10/2016, 11/28/2017, 11/28/2018  Pneumococcal Vaccine (Unspecified Type) 06/30/2013  TDAP Vaccine 10/01/2010 Past Medical History:  
Diagnosis Date  Atrial flutter ([address]   
 saw Dr. Kristie Parsons. EP Ablation 7/30/13  Blepharitis of upper and lower eyelids of both eyes 11/30/2017 Dr. Sanjay Mckeon Chronic right hip pain  DDD (degenerative disc disease), lumbar 6/2006 [employer] [place] combat injury. MRI 5/2008 [place]  ED (erectile dysfunction)  Hearing loss, sensorineural   
 HTN (hypertension) 2006  Hyperlipidemia LDL goal < 130 2006  Insomnia  Lumbar disc disease with radiculopathy 12/2010  
 radiculopathy  ELIZABETH (obstructive sleep apnea) 4/09 [facility], Dr. Chika Sandoval, CPAP  
 PTSD (post-traumatic stress disorder) sees counselor  RLS (restless legs syndrome) 4/09  
 sleep only  Rosacea [facility] Secondary male hypogonadism MRI normal 8/2009. Dr. Michael Chew has a past surgical history that includes hx hernia repair; hx afib ablation (07/30/2013); and COLONOSCOPY (N/A, 7/11/2016). Viagra [sildenafil] Current Outpatient Medications Medication Sig  
 SHINGRIX, PF, 50 mcg/0.5 mL susr injection 0.5 mL by IntraMUSCular route once for 1 dose. Receive 2nd dose in 2-6 months. For Shingles (Zoster) prevention  metoprolol tartrate (LOPRESSOR) 25 mg tablet Take 1 Tab by mouth two (2) times a day.  simvastatin (ZOCOR) 20 mg tablet TAKE 1 TABLET NIGHTLY  ketoconazole (NIZORAL) 2 % topical cream Apply  to affected area two (2) times a day for 28 days.  MICARDIS 40 mg tablet TAKE 1 TABLET DAILY  CAVERJECT 20 mcg solr INJECT 20 MCG INTRACAVERNOSALLY DAILY AS NEEDED  
 traZODone (DESYREL) 50 mg tablet TAKE 1 TABLET NIGHTLY FOR INSOMNIA  zolpidem (AMBIEN) 10 mg tablet Take 1 Tab by mouth nightly as needed for Sleep. Max Daily Amount: 10 mg.  
 BD INSULIN SYRINGE ULTRA-FINE 1/2 mL 30 gauge x 1/2\" syrg FOR USE WITH REMY JET AS DIRECTED BY DOCTOR  BD LUER-MARTHA SYRINGE 3 mL 21 x 1\" syrg  ASPIRIN (ASPIR-81 PO) Take 81 mg by mouth daily.  testosterone cypionate (DEPOTESTOTERONE CYPIONATE) 200 mg/mL injection by IntraMUSCular route once. Every 10 days No current facility-administered medications for this visit. SOCIAL HX:  reports that  has never smoked. he has never used smokeless tobacco. He reports that he does not drink alcohol or use drugs. FAMILY HX: family history includes Hypertension in his father. Review of Systems - History obtained from the patient General ROS: negative for - night sweats, weight gain or weight loss Cardiovascular ROS: no chest pain, dyspnea on exertion, edema Physical exam 
Blood pressure 110/74, pulse 63, temperature 97.5 °F (36.4 °C), temperature source Oral, resp. rate 18, height 6' (1.829 m), weight 226 lb 6.4 oz (102.7 kg). Wt Readings from Last 3 Encounters:  
12/06/18 226 lb 6.4 oz (102.7 kg) 07/16/18 229 lb (103.9 kg)  
11/30/17 233 lb (105.7 kg) He appears well, alert and oriented x 3, pleasant and cooperative. Vitals as noted. No rashes or significant lesions. Neck supple and free of adenopathy, or masses. No thyromegaly or carotid bruits. Cranial nerves normal. Lungs are clear to auscultation. Heart sounds are normal with no murmurs, clicks, gallops or rubs. Abdomen is soft, non- tender, with no masses or organomegaly. Extremities, peripheral pulses and reflexes are normal.  . RECTAL/PROSTATE EXAM: DECLINES. Skin is without rashes or suspicious lesions. Assessment and Plan: 
 
1. Well adult health check 
- SHINGRIX, PF, 50 mcg/0.5 mL susr injection; 0.5 mL by IntraMUSCular route once for 1 dose. Receive 2nd dose in 2-6 months. For Shingles (Zoster) prevention  Dispense: 0.5 mL; Refill: 1 
- PSA W/ REFLX FREE PSA 2. Hyperlipidemia with target low density lipoprotein (LDL) cholesterol less than 130 mg/dL Controlled on current regimen. Continue current medications as written in chart 
- simvastatin (ZOCOR) 20 mg tablet; TAKE 1 TABLET NIGHTLY  Dispense: 90 Tab; Refill: 3 
- LIPID PANEL 
- METABOLIC PANEL, COMPREHENSIVE 3. [facility] fl[facility] unspecified [facility] Currently asymptomatic 
- metoprolol tartrate (LOPRESSOR) 25 mg tablet; Take 1 Tab by mouth two (2) times a day. Dispense: 180 Tab; Refill: 3 
 
4. Essential hypertension Controlled on current regimen. Continue current medications as written in chart. 
- metoprolol tartrate (LOPRESSOR) 25 mg tablet; Take 1 Tab by mouth two (2) times a day. Dispense: 180 Tab; Refill: 3 5. Seborrheic dermatitis 
facial 
- ketoconazole (NIZORAL) 2 % topical cream; Apply  to affected area two (2) times a day for 28 days. Dispense: 30 g; Refill: 1 The patient is asked to make an attempt to improve diet and exercise patterns Avoid tobacco products, excessive alcohol Return for yearly wellness visits The patient is asked to make an attempt to improve diet and exercise patterns Return for yearly wellness visits Discussed the patient's BMI with him. The BMI follow up plan is as follows:  
 
dietary management education, guidance, and counseling 
encourage exercise 
monitor weight 
prescribed dietary intake An After Visit Summary was printed and given to the patient.

## 2018-12-07 LAB
ALBUMIN SERPL-MCNC: 4.5 G/DL (ref 3.5–5.5)
ALBUMIN/GLOB SERPL: 1.7 {RATIO} (ref 1.2–2.2)
ALP SERPL-CCNC: 41 IU/L (ref 39–117)
ALT SERPL-CCNC: 27 IU/L (ref 0–44)
AST SERPL-CCNC: 23 IU/L (ref 0–40)
BILIRUB SERPL-MCNC: 0.9 MG/DL (ref 0–1.2)
BUN SERPL-MCNC: 12 MG/DL (ref 6–24)
BUN/CREAT SERPL: 11 (ref 9–20)
CALCIUM SERPL-MCNC: 9.6 MG/DL (ref 8.7–10.2)
CHLORIDE SERPL-SCNC: 102 MMOL/L (ref 96–106)
CHOLEST SERPL-MCNC: 198 MG/DL (ref 100–199)
CO2 SERPL-SCNC: 24 MMOL/L (ref 20–29)
CREAT SERPL-MCNC: 1.09 MG/DL (ref 0.76–1.27)
GLOBULIN SER CALC-MCNC: 2.6 G/DL (ref 1.5–4.5)
GLUCOSE SERPL-MCNC: 87 MG/DL (ref 65–99)
HDLC SERPL-MCNC: 44 MG/DL
INTERPRETATION, 910389: NORMAL
LDLC SERPL CALC-MCNC: 130 MG/DL (ref 0–99)
POTASSIUM SERPL-SCNC: 4.9 MMOL/L (ref 3.5–5.2)
PROT SERPL-MCNC: 7.1 G/DL (ref 6–8.5)
PSA SERPL-MCNC: 0.7 NG/ML (ref 0–4)
REFLEX CRITERIA: NORMAL
SODIUM SERPL-SCNC: 142 MMOL/L (ref 134–144)
TRIGL SERPL-MCNC: 122 MG/DL (ref 0–149)
VLDLC SERPL CALC-MCNC: 24 MG/DL (ref 5–40)

## 2019-01-10 RX ORDER — PEN NEEDLE, DIABETIC 29 G X1/2"
NEEDLE, DISPOSABLE MISCELLANEOUS
Qty: 30 SYRINGE | Refills: 5 | Status: SHIPPED | OUTPATIENT
Start: 2019-01-10

## 2019-03-21 DIAGNOSIS — I48.92 ATRIAL FLUTTER, UNSPECIFIED TYPE (HCC): ICD-10-CM

## 2019-03-21 DIAGNOSIS — I10 ESSENTIAL HYPERTENSION: ICD-10-CM

## 2019-03-21 RX ORDER — METOPROLOL TARTRATE 25 MG/1
25 TABLET, FILM COATED ORAL 2 TIMES DAILY
Qty: 180 TAB | Refills: 3 | Status: SHIPPED | OUTPATIENT
Start: 2019-03-21 | End: 2019-12-01 | Stop reason: SDUPTHER

## 2019-03-21 RX ORDER — TRAZODONE HYDROCHLORIDE 50 MG/1
TABLET ORAL
Qty: 90 TAB | Refills: 1 | Status: SHIPPED | OUTPATIENT
Start: 2019-03-21 | End: 2019-09-17 | Stop reason: SDUPTHER

## 2019-06-25 DIAGNOSIS — G47.00 INSOMNIA, UNSPECIFIED TYPE: ICD-10-CM

## 2019-06-25 RX ORDER — ZOLPIDEM TARTRATE 10 MG/1
10 TABLET ORAL
Qty: 90 TAB | Refills: 1 | Status: SHIPPED | OUTPATIENT
Start: 2019-06-25 | End: 2020-01-09 | Stop reason: SDUPTHER

## 2019-06-28 ENCOUNTER — DOCUMENTATION ONLY (OUTPATIENT)
Dept: INTERNAL MEDICINE CLINIC | Age: 54
End: 2019-06-28

## 2019-09-17 RX ORDER — TRAZODONE HYDROCHLORIDE 50 MG/1
TABLET ORAL
Qty: 90 TAB | Refills: 4 | Status: SHIPPED | OUTPATIENT
Start: 2019-09-17 | End: 2022-01-26 | Stop reason: ALTCHOICE

## 2019-11-12 RX ORDER — TELMISARTAN 40 MG/1
TABLET ORAL
Qty: 90 TAB | Refills: 4 | Status: SHIPPED | OUTPATIENT
Start: 2019-11-12 | End: 2021-01-21 | Stop reason: SDUPTHER

## 2019-12-01 DIAGNOSIS — I10 ESSENTIAL HYPERTENSION: ICD-10-CM

## 2019-12-01 DIAGNOSIS — I48.92 ATRIAL FLUTTER, UNSPECIFIED TYPE (HCC): ICD-10-CM

## 2019-12-01 RX ORDER — METOPROLOL TARTRATE 25 MG/1
TABLET, FILM COATED ORAL
Qty: 180 TAB | Refills: 4 | Status: SHIPPED | OUTPATIENT
Start: 2019-12-01 | End: 2021-01-21 | Stop reason: SDUPTHER

## 2020-01-06 ENCOUNTER — TELEPHONE (OUTPATIENT)
Dept: INTERNAL MEDICINE CLINIC | Age: 55
End: 2020-01-06

## 2020-01-06 NOTE — TELEPHONE ENCOUNTER
Dr. Julieann Meckel   Received:  Today   Message Contents   Neeru Gandhi sent to [facility]         Pt is requesting a referral for Dr. Salima Parry, Endocrinologist (routine follow-up) for his appointment on Wednesday January 15,  2020  Best contact number is [phone]

## 2020-01-09 ENCOUNTER — OFFICE VISIT (OUTPATIENT)
Dept: INTERNAL MEDICINE CLINIC | Age: 55
End: 2020-01-09

## 2020-01-09 ENCOUNTER — HOSPITAL ENCOUNTER (OUTPATIENT)
Dept: LAB | Age: 55
Discharge: HOME | End: 2020-01-09

## 2020-01-09 VITALS
RESPIRATION RATE: 18 BRPM | HEIGHT: 72 IN | DIASTOLIC BLOOD PRESSURE: 80 MMHG | OXYGEN SATURATION: 95 % | SYSTOLIC BLOOD PRESSURE: 121 MMHG | BODY MASS INDEX: 32.91 KG/M2 | TEMPERATURE: 97.9 F | HEART RATE: 70 BPM | WEIGHT: 243 LBS

## 2020-01-09 DIAGNOSIS — I10 ESSENTIAL HYPERTENSION: ICD-10-CM

## 2020-01-09 DIAGNOSIS — Z00.00 WELL ADULT HEALTH CHECK: ICD-10-CM

## 2020-01-09 DIAGNOSIS — G89.29 CHRONIC HEEL PAIN, RIGHT: ICD-10-CM

## 2020-01-09 DIAGNOSIS — E29.1 SECONDARY MALE HYPOGONADISM: ICD-10-CM

## 2020-01-09 DIAGNOSIS — E78.5 HYPERLIPIDEMIA WITH TARGET LDL LESS THAN 130: ICD-10-CM

## 2020-01-09 DIAGNOSIS — M79.671 CHRONIC HEEL PAIN, RIGHT: ICD-10-CM

## 2020-01-09 DIAGNOSIS — I48.92 ATRIAL FLUTTER, UNSPECIFIED TYPE (HCC): ICD-10-CM

## 2020-01-09 DIAGNOSIS — G47.00 INSOMNIA, UNSPECIFIED TYPE: ICD-10-CM

## 2020-01-09 DIAGNOSIS — Z00.00 WELL ADULT HEALTH CHECK: Primary | ICD-10-CM

## 2020-01-09 LAB
ALBUMIN SERPL-MCNC: 4 G/DL (ref 3.5–5)
ALBUMIN/GLOB SERPL: 1.3 {RATIO} (ref 1.1–2.2)
ALP SERPL-CCNC: 42 U/L (ref 45–117)
ALT SERPL-CCNC: 50 U/L (ref 12–78)
ANION GAP SERPL CALC-SCNC: 4 MMOL/L (ref 5–15)
AST SERPL-CCNC: 18 U/L (ref 15–37)
BILIRUB SERPL-MCNC: 0.7 MG/DL (ref 0.2–1)
BUN SERPL-MCNC: 12 MG/DL (ref 6–20)
BUN/CREAT SERPL: 10 (ref 12–20)
CALCIUM SERPL-MCNC: 8.8 MG/DL (ref 8.5–10.1)
CHLORIDE SERPL-SCNC: 105 MMOL/L (ref 97–108)
CHOLEST SERPL-MCNC: 212 MG/DL
CO2 SERPL-SCNC: 30 MMOL/L (ref 21–32)
CREAT SERPL-MCNC: 1.18 MG/DL (ref 0.7–1.3)
ERYTHROCYTE [DISTWIDTH] IN BLOOD BY AUTOMATED COUNT: 12.2 % (ref 11.5–14.5)
GLOBULIN SER CALC-MCNC: 3.2 G/DL (ref 2–4)
GLUCOSE SERPL-MCNC: 80 MG/DL (ref 65–100)
HCT VFR BLD AUTO: 53.3 % (ref 36.6–50.3)
HDLC SERPL-MCNC: 42 MG/DL
HDLC SERPL: 5 {RATIO} (ref 0–5)
HGB BLD-MCNC: 17.4 G/DL (ref 12.1–17)
LDLC SERPL CALC-MCNC: 138.8 MG/DL (ref 0–100)
LIPID PROFILE,FLP: ABNORMAL
MCH RBC QN AUTO: 31.3 PG (ref 26–34)
MCHC RBC AUTO-ENTMCNC: 32.6 G/DL (ref 30–36.5)
MCV RBC AUTO: 95.9 FL (ref 80–99)
NRBC # BLD: 0 K/UL (ref 0–0.01)
NRBC BLD-RTO: 0 PER 100 WBC
PLATELET # BLD AUTO: 246 K/UL (ref 150–400)
PMV BLD AUTO: 9.8 FL (ref 8.9–12.9)
POTASSIUM SERPL-SCNC: 4.8 MMOL/L (ref 3.5–5.1)
PROT SERPL-MCNC: 7.2 G/DL (ref 6.4–8.2)
RBC # BLD AUTO: 5.56 M/UL (ref 4.1–5.7)
SODIUM SERPL-SCNC: 139 MMOL/L (ref 136–145)
TRIGL SERPL-MCNC: 156 MG/DL (ref ?–150)
VLDLC SERPL CALC-MCNC: 31.2 MG/DL
WBC # BLD AUTO: 7.3 K/UL (ref 4.1–11.1)

## 2020-01-09 RX ORDER — ZOSTER VACCINE RECOMBINANT, ADJUVANTED 50 MCG/0.5
0.5 KIT INTRAMUSCULAR ONCE
Qty: 0.5 ML | Refills: 1 | Status: SHIPPED | OUTPATIENT
Start: 2020-01-09 | End: 2020-01-09

## 2020-01-09 RX ORDER — ZOLPIDEM TARTRATE 10 MG/1
10 TABLET ORAL
Qty: 90 TAB | Refills: 1 | Status: SHIPPED | OUTPATIENT
Start: 2020-01-09 | End: 2021-01-21 | Stop reason: SDUPTHER

## 2020-01-09 NOTE — PATIENT INSTRUCTIONS
Body Mass Index: Care Instructions  Your Care Instructions    Body mass index (BMI) can help you see if your weight is raising your risk for health problems. It uses a formula to compare how much you weigh with how tall you are. · A BMI lower than 18.5 is considered underweight. · A BMI between 18.5 and 24.9 is considered healthy. · A BMI between 25 and 29.9 is considered overweight. A BMI of 30 or higher is considered obese. If your BMI is in the normal range, it means that you have a lower risk for weight-related health problems. If your BMI is in the overweight or obese range, you may be at increased risk for weight-related health problems, such as high blood pressure, heart disease, stroke, arthritis or joint pain, and diabetes. If your BMI is in the underweight range, you may be at increased risk for health problems such as fatigue, lower protection (immunity) against illness, muscle loss, bone loss, hair loss, and hormone problems. BMI is just one measure of your risk for weight-related health problems. You may be at higher risk for health problems if you are not active, you eat an unhealthy diet, or you drink too much alcohol or use tobacco products. Follow-up care is a key part of your treatment and safety. Be sure to make and go to all appointments, and call your doctor if you are having problems. It's also a good idea to know your test results and keep a list of the medicines you take. How can you care for yourself at home? · Practice healthy eating habits. This includes eating plenty of fruits, vegetables, whole grains, lean protein, and low-fat dairy. · If your doctor recommends it, get more exercise. Walking is a good choice. Bit by bit, increase the amount you walk every day. Try for at least 30 minutes on most days of the week. · Do not smoke. Smoking can increase your risk for health problems. If you need help quitting, talk to your doctor about stop-smoking programs and medicines. These can increase your chances of quitting for good. · Limit alcohol to 2 drinks a day for men and 1 drink a day for women. Too much alcohol can cause health problems. If you have a BMI higher than 25  · Your doctor may do other tests to check your risk for weight-related health problems. This may include measuring the distance around your waist. A waist measurement of more than 40 inches in men or 35 inches in women can increase the risk of weight-related health problems. · Talk with your doctor about steps you can take to stay healthy or improve your health. You may need to make lifestyle changes to lose weight and stay healthy, such as changing your diet and getting regular exercise. If you have a BMI lower than 18.5  · Your doctor may do other tests to check your risk for health problems. · Talk with your doctor about steps you can take to stay healthy or improve your health. You may need to make lifestyle changes to gain or maintain weight and stay healthy, such as getting more healthy foods in your diet and doing exercises to build muscle. Where can you learn more? Go to http://kristi-jason.info/. Enter S176 in the search box to learn more about \"Body Mass Index: Care Instructions. \"  Current as of: October 13, 2016  Content Version: 11.4  © 2663-2100 [employer]wise, [employer]orporated. Care instructions adapted under license by [employer] (which disclaims liability or warranty for this information). If you have questions about a medical condition or this instruction, always ask your healthcare professional. [address] any warranty or liability for your use of this information.

## 2020-01-09 NOTE — PROGRESS NOTES
Esthela Gould is a 49 y.o. male  Presenting for his annual checkup and health maintenance review and follow-up    Doing fairly well. Seeing Dr. Joel Cardoza for hypogonadism. Continues testosterone injections. ED is reasonably controlled with Caverject. Chronic insomnia. Taking Ambien chronically with and trazodone with some relief. History of PTSD. Feels stable on current regimen. Reports right heel pain for over 1 year. Hurts to step on it. No injury. Has tried padding without relief. Has an appointment with orthopedics next week. Hypertension  Hypertension ROS: taking medications as instructed, no medication side effects noted, no TIA's, no chest pain on exertion, no dyspnea on exertion, no swelling of ankles     reports that he has never smoked. He has never used smokeless tobacco.    reports no history of alcohol use. BP Readings from Last 2 Encounters:   01/09/20 121/80   12/06/18 110/74       Hyperlipidemia  He stopped taking simvastatin. ROS: taking medications as instructed, no medication side effects noted  No new myalgias, no joint pains, no weakness  No TIA's, no chest pain on exertion, no dyspnea on exertion, no swelling of ankles.    Lab Results   Component Value Date/Time    Cholesterol, total 212 (H) 01/09/2020 11:25 AM    HDL Cholesterol 42 01/09/2020 11:25 AM    LDL, calculated 138.8 (H) 01/09/2020 11:25 AM    VLDL, calculated 31.2 01/09/2020 11:25 AM    Triglyceride 156 (H) 01/09/2020 11:25 AM    CHOL/HDL Ratio 5.0 01/09/2020 11:25 AM       Exercise: moderately active  Diet: generally follows a low fat low cholesterol diet  Health Maintenance   Topic Date Due    Shingrix Vaccine Age 49> (1 of 2) 04/17/2016    DTaP/Tdap/Td series (2 - Td) 10/01/2020    COLONOSCOPY  07/11/2026    Influenza Age 5 to Adult  Completed    Pneumococcal 0-64 years  Aged Out     Health Maintenance reviewed  Last digital rectal exam:  none  Lab Results   Component Value Date/Time    Prostate Specific Ag 0.7 12/06/2018 10:57 AM    Prostate Specific Ag 0.6 11/30/2017 12:17 PM    Prostate Specific Ag 0.8 11/02/2016 12:09 PM    Prostate Specific Ag 0.4 06/08/2010 03:58 PM    Prostate Specific Ag 0.5 11/06/2009 08:31 AM    Prostate Specific Ag 0.5 07/14/2009 10:38 AM       Vaccinations reviewed  Immunization History   Administered Date(s) Administered    Hep A and Hep B Vaccine 11/30/2017    Influenza Vaccine 11/01/2012, 09/01/2013, 11/01/2015, 10/10/2016, 11/28/2017, 11/28/2018, 10/01/2019    Pneumococcal Vaccine (Unspecified Type) 06/30/2013    TDAP Vaccine 10/01/2010       Past Medical History:   Diagnosis Date    Atrial flutter ([address]     saw Dr. Cesar Thomas. EP Ablation 7/30/13    Blepharitis of upper and lower eyelids of both eyes 11/30/2017    Dr. Sunil Heredia    Chronic right hip pain     DDD (degenerative disc disease), lumbar 6/2006    [place] MRI 5/2008 [facility] (erectile dysfunction)     Hearing loss, sensorineural     HTN (hypertension) 2006    Hyperlipidemia LDL goal < 130 2006    Insomnia     Lumbar disc disease with radiculopathy 12/2010    radiculopathy    ELIZABETH (obstructive sleep apnea) 4/09    [place], Dr. Drew Lancaster, CPAP    PTSD (post-traumatic stress disorder)     sees counselor    RLS (restless legs syndrome) 4/09    sleep only    Rosacea     Secondary male hypogonadism     MRI normal 8/2009. Dr. Linsey Mckeon    Varicose vein of leg       has a past surgical history that includes hx hernia repair; hx afib ablation (07/30/2013); and colonoscopy (N/A, 7/11/2016). Viagra [sildenafil]   Current Outpatient Medications   Medication Sig    SHINGRIX, PF, 50 mcg/0.5 mL susr injection 0.5 mL by IntraMUSCular route once for 1 dose. Receive 2nd dose in 2-6 months. For Shingles (Zoster) prevention    zolpidem (AMBIEN) 10 mg tablet Take 1 Tab by mouth nightly as needed for Sleep.  Max Daily Amount: 10 mg.    alprostadil (CAVERJECT IMPULSE) 20 mcg kit 20 mcg by IntraCAVernosal route daily as needed (ED).  metoprolol tartrate (LOPRESSOR) 25 mg tablet TAKE 1 TABLET TWICE A DAY    MICARDIS 40 mg tablet TAKE 1 TABLET DAILY    traZODone (DESYREL) 50 mg tablet TAKE 1 TABLET NIGHTLY FOR INSOMNIA    BD INSULIN SYRINGE ULTRA-FINE 0.5 mL 30 gauge x 1/2\" syrg FOR USE WITH REMY JET AS DIRECTED BY DOCTOR    BD LUER-MARTHA SYRINGE 3 mL 21 x 1\" syrg     ASPIRIN (ASPIR-81 PO) Take 81 mg by mouth daily.  testosterone cypionate (DEPOTESTOTERONE CYPIONATE) 200 mg/mL injection by IntraMUSCular route once. Every 10 days     No current facility-administered medications for this visit. SOCIAL HX:  reports that he has never smoked. He has never used smokeless tobacco. He reports that he does not drink alcohol or use drugs. FAMILY HX: family history includes Hypertension in his father. Review of Systems - History obtained from the patient  General ROS: negative for - night sweats, weight gain or weight loss  Cardiovascular ROS: no chest pain, dyspnea on exertion, edema    Physical exam  Blood pressure 121/80, pulse 70, temperature 97.9 °F (36.6 °C), temperature source Oral, resp. rate 18, height 6' (1.829 m), weight 243 lb (110.2 kg), SpO2 95 %. Wt Readings from Last 3 Encounters:   01/09/20 243 lb (110.2 kg)   12/06/18 226 lb 6.4 oz (102.7 kg)   07/16/18 229 lb (103.9 kg)     He appears well, alert and oriented x 3, pleasant and cooperative. Vitals as noted. No rashes or significant lesions. Neck supple and free of adenopathy, or masses. No thyromegaly or carotid bruits. Cranial nerves normal. Lungs are clear to auscultation. Heart sounds are normal with no murmurs, clicks, gallops or rubs. Abdomen is soft, non- tender, with no masses or organomegaly. Extremities, peripheral pulses and reflexes are normal.  . RECTAL/PROSTATE EXAM: DECLINES. Skin is without rashes or suspicious lesions. Diagnoses and all orders for this visit:    1.  Well adult health check  - SHINGRIX, PF, 50 mcg/0.5 mL susr injection; 0.5 mL by IntraMUSCular route once for 1 dose. Receive 2nd dose in 2-6 months. For Shingles (Zoster) prevention  -     LIPID PANEL; Future  -     CBC W/O DIFF; Future  -     METABOLIC PANEL, COMPREHENSIVE; Future  -     PSA W/ REFLX FREE PSA; Future    2. Chronic heel pain, right  -     REFERRAL TO ORTHOPEDIC SURGERY    3. Essential hypertension  Controlled on current regimen. Continue current medications as written in chart. 4. Hyperlipidemia with target LDL less than 130  LDL is again and somewhat increased, but not severe. Remain off medication.  -     LIPID PANEL; Future    5. Secondary male hypogonadism  Controlled on current regimen. Continue current medications as written in chart. F/u Dr. Jatin Weston    6. Atrial flutter, unspecified type ([address]  Currently asymptomatic    7. Insomnia, unspecified type  Controlled on current regimen. Continue current medications as written in chart.  -     zolpidem (AMBIEN) 10 mg tablet; Take 1 Tab by mouth nightly as needed for Sleep. Max Daily Amount: 10 mg. The patient is asked to make an attempt to improve diet and exercise patterns  Avoid tobacco products, excessive alcohol    Return for yearly wellness visits    An After Visit Summary was printed and given to the patient. Discussed the patient's BMI with him. The BMI follow up plan is as follows:     dietary management education, guidance, and counseling  encourage exercise  monitor weight  prescribed dietary intake    An After Visit Summary was printed and given to the patient.

## 2020-01-11 LAB
PSA SERPL-MCNC: 0.6 NG/ML (ref 0–4)
REFLEX CRITERIA: NORMAL

## 2020-01-13 NOTE — TELEPHONE ENCOUNTER
Referral has been obtained and faxed to Dr. Darlene Nagy office at [phone].  Auth # [id number]  12 visits 1/9/2020-1/8/2021

## 2020-06-10 ENCOUNTER — TELEPHONE (OUTPATIENT)
Dept: INTERNAL MEDICINE CLINIC | Age: 55
End: 2020-06-10

## 2020-06-10 NOTE — TELEPHONE ENCOUNTER
----- Message from Dasia Yates sent at 6/10/2020  1:24 PM EDT -----  Regarding: Dr. Eric Peraza Message/Vendor Calls    Caller's first and last name: Nae Karen      Reason for call:physical in January 2021 on a Thursday      Callback required yes/no and why:yes      Best contact number(s):[phone]      Details to clarify the request:physical in January 2021      Dasia Yates

## 2020-06-10 NOTE — TELEPHONE ENCOUNTER
----- Message from Ira Blanchard sent at 6/10/2020  1:24 PM EDT -----  Regarding: Dr. Waleska Mcgowan Message/Vendor Calls    Caller's first and last name: Shawna Anton      Reason for call:physical in January 2021 on a Thursday      Callback required yes/no and why:yes      Best contact number(s):[phone]      Details to clarify the request:physical in January 2021      Ira Blanchard

## 2020-06-10 NOTE — TELEPHONE ENCOUNTER
Dr. Dilma Velasquez schedule is not open for January 2021 as of yet. Will schedule the appointment once it has been opened and notify patient. I notified patient of this plan through [address] [place] [address] [phone].

## 2020-08-03 NOTE — TELEPHONE ENCOUNTER
Appointment Information     COMPLETE PHYSICAL  Scheduled    1/21/2021  9:00 AM  Copay Due: $0.00        Time  Provider  Department  Length     9:00 AM  Jono Kovacs MD  [facility]  20 min        Notes:    Complete physical        History    Made On:  8/3/2020 10:38 AM  By:  Ginger Oliva        Notified through [employer].

## 2021-01-14 RX ORDER — ALPROSTADIL 20 UG/.5ML
INJECTION, POWDER, LYOPHILIZED, FOR SOLUTION INTRACAVERNOUS
Qty: 20 KIT | Refills: 5 | Status: SHIPPED | OUTPATIENT
Start: 2021-01-14 | End: 2022-04-12

## 2021-01-21 ENCOUNTER — OFFICE VISIT (OUTPATIENT)
Dept: INTERNAL MEDICINE CLINIC | Age: 56
End: 2021-01-21
Payer: OTHER GOVERNMENT

## 2021-01-21 VITALS
BODY MASS INDEX: 34.46 KG/M2 | HEIGHT: 72 IN | HEART RATE: 73 BPM | RESPIRATION RATE: 14 BRPM | TEMPERATURE: 97.6 F | SYSTOLIC BLOOD PRESSURE: 123 MMHG | DIASTOLIC BLOOD PRESSURE: 79 MMHG | OXYGEN SATURATION: 97 % | WEIGHT: 254.4 LBS

## 2021-01-21 DIAGNOSIS — E78.5 HYPERLIPIDEMIA WITH TARGET LDL LESS THAN 130: ICD-10-CM

## 2021-01-21 DIAGNOSIS — Z23 ENCOUNTER FOR IMMUNIZATION: ICD-10-CM

## 2021-01-21 DIAGNOSIS — I10 ESSENTIAL HYPERTENSION: ICD-10-CM

## 2021-01-21 DIAGNOSIS — Z00.00 WELL ADULT HEALTH CHECK: Primary | ICD-10-CM

## 2021-01-21 DIAGNOSIS — I48.92 ATRIAL FLUTTER, UNSPECIFIED TYPE (HCC): ICD-10-CM

## 2021-01-21 DIAGNOSIS — R10.11 RUQ ABDOMINAL PAIN: ICD-10-CM

## 2021-01-21 DIAGNOSIS — G47.00 INSOMNIA, UNSPECIFIED TYPE: ICD-10-CM

## 2021-01-21 DIAGNOSIS — E29.1 SECONDARY MALE HYPOGONADISM: ICD-10-CM

## 2021-01-21 PROCEDURE — 90715 TDAP VACCINE 7 YRS/> IM: CPT | Performed by: INTERNAL MEDICINE

## 2021-01-21 PROCEDURE — 99214 OFFICE O/P EST MOD 30 MIN: CPT | Performed by: INTERNAL MEDICINE

## 2021-01-21 PROCEDURE — 90471 IMMUNIZATION ADMIN: CPT | Performed by: INTERNAL MEDICINE

## 2021-01-21 PROCEDURE — 99396 PREV VISIT EST AGE 40-64: CPT | Performed by: INTERNAL MEDICINE

## 2021-01-21 RX ORDER — ZOLPIDEM TARTRATE 10 MG/1
10 TABLET ORAL
Qty: 90 TAB | Refills: 1 | Status: SHIPPED | OUTPATIENT
Start: 2021-01-21 | End: 2021-12-07 | Stop reason: SDUPTHER

## 2021-01-21 RX ORDER — DIGOXIN 125 MCG
TABLET ORAL
COMMUNITY
End: 2021-01-21 | Stop reason: ALTCHOICE

## 2021-01-21 RX ORDER — METOPROLOL TARTRATE 25 MG/1
TABLET, FILM COATED ORAL
Qty: 180 TAB | Refills: 3 | Status: SHIPPED | OUTPATIENT
Start: 2021-01-21

## 2021-01-21 RX ORDER — TELMISARTAN 40 MG/1
TABLET ORAL
Qty: 90 TAB | Refills: 3 | Status: SHIPPED | OUTPATIENT
Start: 2021-01-21 | End: 2021-12-07

## 2021-01-21 NOTE — PROGRESS NOTES
Johann Lloyd is a 48 y.o. male  Presenting for his annual checkup and health maintenance review and follow-up    Reports stress re: work from home for [employer]. COVID-19 stress. .      Seeing Dr. Eliezer Smith for hypogonadism. Continues testosterone injections. ED is reasonably controlled with Caverject. Last injection was about 10 days ago. Right upper quadrant abdominal pain for about 2 months. It is relatively consistent and he feels a pressure and some mild discomfort. Does not change with eating or bowel movements. No associated nausea vomiting diarrhea or shortness of breath. No history of gallbladder issues. Colonoscopy July 2016. Denies blood in stool. Chronic insomnia. Taking Ambien chronically and prn trazodone with some help, but often is not enough  He takes an hour to calm down before bedtime. He does wake up often in the middle the night. He relives attacks at night. History of PTSD. Hypertension  Hypertension ROS: taking medications as instructed, no medication side effects noted, no TIA's, no chest pain on exertion, no dyspnea on exertion, no swelling of ankles     reports that he has never smoked. He has never used smokeless tobacco.    reports no history of alcohol use. BP Readings from Last 2 Encounters:   01/21/21 123/79   01/09/20 121/80     Hyperlipidemia  He stopped taking simvastatin. ROS: taking medications as instructed, no medication side effects noted  No new myalgias, no joint pains, no weakness  No TIA's, no chest pain on exertion, no dyspnea on exertion, no swelling of ankles.    Lab Results   Component Value Date/Time    Cholesterol, total 212 (H) 01/09/2020 11:25 AM    HDL Cholesterol 42 01/09/2020 11:25 AM    LDL, calculated 138.8 (H) 01/09/2020 11:25 AM    VLDL, calculated 31.2 01/09/2020 11:25 AM    Triglyceride 156 (H) 01/09/2020 11:25 AM    CHOL/HDL Ratio 5.0 01/09/2020 11:25 AM       Exercise: moderately active  Diet: generally follows a low fat low cholesterol diet  Health Maintenance   Topic Date Due    COVID-19 Vaccine (1 of 2) 04/17/1982    Shingrix Vaccine Age 50> (1 of 2) 04/17/2016    DTaP/Tdap/Td series (2 - Td) 10/01/2020    Lipid Screen  01/09/2025    Colorectal Cancer Screening Combo  07/11/2026    Flu Vaccine  Completed    Pneumococcal 0-64 years  Aged Out     Health Maintenance reviewed  Last digital rectal exam:  none  Lab Results   Component Value Date/Time    Prostate Specific Ag 0.6 01/09/2020 11:25 AM    Prostate Specific Ag 0.7 12/06/2018 10:57 AM    Prostate Specific Ag 0.6 11/30/2017 12:17 PM    Prostate Specific Ag 0.4 06/08/2010 03:58 PM    Prostate Specific Ag 0.5 11/06/2009 08:31 AM    Prostate Specific Ag 0.5 07/14/2009 10:38 AM       Vaccinations reviewed  Immunization History   Administered Date(s) Administered    Hep A and Hep B Vaccine 11/30/2017    Influenza Vaccine 11/01/2012, 09/01/2013, 11/01/2015, 10/10/2016, 11/28/2017, 12/07/2017, 10/05/2018, 11/28/2018, 10/01/2019, 10/07/2019, 09/24/2020    Pneumococcal Polysaccharide (PPSV-23) 06/20/2013    Pneumococcal Vaccine (Unspecified Type) 06/30/2013    TDAP Vaccine 10/01/2010       Past Medical History:   Diagnosis Date    Atrial flutter ([address]     saw Dr. Mindy Cazares. EP Ablation 7/30/13    Blepharitis of upper and lower eyelids of both eyes 11/30/2017    Dr. Sudhakar Craft    Chronic right hip pain     DDD (degenerative disc disease), lumbar 6/2006    [place]. MRI 5/2008 [facility] ED (erectile dysfunction)     Hearing loss, sensorineural     HTN (hypertension) 2006    Hyperlipidemia LDL goal < 130 2006    Insomnia     Lumbar disc disease with radiculopathy 12/2010    radiculopathy    ELIZABETH (obstructive sleep apnea) 4/09    [place], Dr. Riana Guerin, CPAP    PTSD (post-traumatic stress disorder)     sees counselor    RLS (restless legs syndrome) 4/09    sleep only    Rosacea     Secondary male hypogonadism     MRI normal 8/2009.   Dr. Brown How  Varicose vein of leg       has a past surgical history that includes hx hernia repair; hx afib ablation (07/30/2013); and colonoscopy (N/A, 7/11/2016). Viagra [sildenafil]   Current Outpatient Medications   Medication Sig    zolpidem (Ambien) 10 mg tablet Take 1 Tab by mouth nightly as needed for Sleep. Max Daily Amount: 10 mg.    metoprolol tartrate (LOPRESSOR) 25 mg tablet TAKE 1 TABLET TWICE A DAY    telmisartan (Micardis) 40 mg tablet TAKE 1 TABLET DAILY    Caverject Impulse 20 mcg kit INJECT 20 MCG BY INTRACAVERNOSAL ROUTE DAILY AS NEEDED    traZODone (DESYREL) 50 mg tablet TAKE 1 TABLET NIGHTLY FOR INSOMNIA    BD INSULIN SYRINGE ULTRA-FINE 0.5 mL 30 gauge x 1/2\" syrg FOR USE WITH REMY JET AS DIRECTED BY DOCTOR    BD LUER-MARTHA SYRINGE 3 mL 21 x 1\" syrg     ASPIRIN (ASPIR-81 PO) Take 81 mg by mouth daily.  testosterone cypionate (DEPOTESTOTERONE CYPIONATE) 200 mg/mL injection by IntraMUSCular route once. Every 10 days     No current facility-administered medications for this visit. SOCIAL HX:  reports that he has never smoked. He has never used smokeless tobacco. He reports that he does not drink alcohol or use drugs. FAMILY HX: family history includes Hypertension in his father. Review of Systems - History obtained from the patient  General ROS: negative for - night sweats, weight gain or weight loss  Cardiovascular ROS: no chest pain, dyspnea on exertion, edema    Physical exam  Blood pressure 123/79, pulse 73, temperature 97.6 °F (36.4 °C), temperature source Oral, resp. rate 14, height 6' (1.829 m), weight 254 lb 6.4 oz (115.4 kg), SpO2 97 %. Wt Readings from Last 3 Encounters:   01/21/21 254 lb 6.4 oz (115.4 kg)   01/09/20 243 lb (110.2 kg)   12/06/18 226 lb 6.4 oz (102.7 kg)     He appears well, alert and oriented x 3, pleasant and cooperative. Vitals as noted. No rashes or significant lesions. Neck supple and free of adenopathy, or masses.   No thyromegaly or carotid bruits. Cranial nerves normal. Lungs are clear to auscultation. Heart sounds are normal with no murmurs, clicks, gallops or rubs. Abdomen is soft, perhaps mild tenderness and fullness in the right upper quadrant but no obvious palpable mass. I question whether the liver is slightly enlarged. Extremities, peripheral pulses and reflexes are normal.  . RECTAL/PROSTATE EXAM: DECLINES. Skin is without rashes or suspicious lesions. Diagnoses and all orders for this visit:    1. Well adult health check  2. Encounter for immunization  Recommend COVID-19 vaccine once available. He defers Shingrix for now. -     TETANUS, DIPHTHERIA TOXOIDS AND ACELLULAR PERTUSSIS VACCINE (TDAP), IN INDIVIDS. >=7, IM  -     AL IMMUNIZ ADMIN,1 SINGLE/COMB VAC/TOXOID    3. RUQ abdominal pain  Mild right upper quadrant discomfort for a couple of months. No clear association with eating. History of colonoscopy 1/2 years ago. Will check right upper quadrant ultrasound. Return to clinic if not improving.  -     [employer]; Future    4. Insomnia, unspecified type  Controlled. Is having some intermittent PTSD flares. Recommend increasing trazodone to 100 mg as needed. Recommend that he purchase a meditation apps such as headspace or calm. Could consider trial of clonidine.  -     zolpidem (Ambien) 10 mg tablet; Take 1 Tab by mouth nightly as needed for Sleep. Max Daily Amount: 10 mg.    5. Atrial flutter, unspecified type [address] [address] [facility]  Currently asymptomatic. Taking metoprolol. L low CHADS2 score necessitates no anticoagulant therapy. -     metoprolol tartrate (LOPRESSOR) 25 mg tablet; TAKE 1 TABLET TWICE A DAY    6. Essential hypertension  This condition is controlled on current medication regimen as written in medication list.  Medications refilled. -     metoprolol tartrate (LOPRESSOR) 25 mg tablet; TAKE 1 TABLET TWICE A DAY  -     telmisartan (Micardis) 40 mg tablet; TAKE 1 TABLET DAILY  -     CBC W/O DIFF;  Future  -     METABOLIC PANEL, COMPREHENSIVE; Future    7. Secondary male hypogonadism  Last injection was about 10 days ago. He is seeing Dr. Merline Cinnamon but I will check labs. -     PSA W/ REFLX FREE PSA; Future  -     TESTOSTERONE, TOTAL, ADULT MALE; Future    8. Hyperlipidemia with target LDL less than 130  This condition is controlled on current medication regimen as written in medication list.  Medications refilled  -     LIPID PANEL; Future          The patient is asked to make an attempt to improve diet and exercise patterns  Avoid tobacco products, excessive alcohol    Return for yearly wellness visits    An After Visit Summary was printed and given to the patient. Discussed the patient's BMI with him. The BMI follow up plan is as follows:     dietary management education, guidance, and counseling  encourage exercise  monitor weight  prescribed dietary intake    An After Visit Summary was printed and given to the patient.

## 2021-01-23 LAB
ALBUMIN SERPL-MCNC: 4.2 G/DL (ref 3.5–5)
ALBUMIN/GLOB SERPL: 1.4 {RATIO} (ref 1.1–2.2)
ALP SERPL-CCNC: 54 U/L (ref 45–117)
ALT SERPL-CCNC: 72 U/L (ref 12–78)
ANION GAP SERPL CALC-SCNC: 3 MMOL/L (ref 5–15)
AST SERPL-CCNC: 33 U/L (ref 15–37)
BILIRUB SERPL-MCNC: 0.9 MG/DL (ref 0.2–1)
BUN SERPL-MCNC: 9 MG/DL (ref 6–20)
BUN/CREAT SERPL: 8 (ref 12–20)
CALCIUM SERPL-MCNC: 9.1 MG/DL (ref 8.5–10.1)
CHLORIDE SERPL-SCNC: 105 MMOL/L (ref 97–108)
CHOLEST SERPL-MCNC: 226 MG/DL
CO2 SERPL-SCNC: 29 MMOL/L (ref 21–32)
CREAT SERPL-MCNC: 1.2 MG/DL (ref 0.7–1.3)
ERYTHROCYTE [DISTWIDTH] IN BLOOD BY AUTOMATED COUNT: 13.8 % (ref 11.5–14.5)
GLOBULIN SER CALC-MCNC: 3.1 G/DL (ref 2–4)
GLUCOSE SERPL-MCNC: 92 MG/DL (ref 65–100)
HCT VFR BLD AUTO: 54 % (ref 36.6–50.3)
HDLC SERPL-MCNC: 44 MG/DL
HDLC SERPL: 5.1 {RATIO} (ref 0–5)
HGB BLD-MCNC: 18 G/DL (ref 12.1–17)
LDLC SERPL CALC-MCNC: 153.8 MG/DL (ref 0–100)
LIPID PROFILE,FLP: ABNORMAL
MCH RBC QN AUTO: 30.7 PG (ref 26–34)
MCHC RBC AUTO-ENTMCNC: 33.3 G/DL (ref 30–36.5)
MCV RBC AUTO: 92 FL (ref 80–99)
NRBC # BLD: 0 K/UL (ref 0–0.01)
NRBC BLD-RTO: 0 PER 100 WBC
PLATELET # BLD AUTO: 249 K/UL (ref 150–400)
PMV BLD AUTO: 9.9 FL (ref 8.9–12.9)
POTASSIUM SERPL-SCNC: 4.4 MMOL/L (ref 3.5–5.1)
PROT SERPL-MCNC: 7.3 G/DL (ref 6.4–8.2)
PSA SERPL-MCNC: 0.6 NG/ML (ref 0–4)
RBC # BLD AUTO: 5.87 M/UL (ref 4.1–5.7)
REFLEX CRITERIA: NORMAL
SODIUM SERPL-SCNC: 137 MMOL/L (ref 136–145)
TESTOST SERPL-MCNC: 152 NG/DL (ref 264–916)
TRIGL SERPL-MCNC: 141 MG/DL (ref ?–150)
VLDLC SERPL CALC-MCNC: 28.2 MG/DL
WBC # BLD AUTO: 7 K/UL (ref 4.1–11.1)

## 2021-01-26 ENCOUNTER — HOSPITAL ENCOUNTER (OUTPATIENT)
Dept: ULTRASOUND IMAGING | Age: 56
Discharge: HOME | End: 2021-01-26
Attending: INTERNAL MEDICINE
Payer: OTHER GOVERNMENT

## 2021-01-26 DIAGNOSIS — R10.11 RUQ ABDOMINAL PAIN: ICD-10-CM

## 2021-01-26 PROCEDURE — 76705 ECHO EXAM OF ABDOMEN: CPT

## 2021-04-23 LAB — PSA, EXTERNAL: 0.7

## 2021-12-07 DIAGNOSIS — I10 ESSENTIAL HYPERTENSION: ICD-10-CM

## 2021-12-07 DIAGNOSIS — G47.00 INSOMNIA, UNSPECIFIED TYPE: ICD-10-CM

## 2021-12-07 RX ORDER — TELMISARTAN 40 MG/1
TABLET ORAL
Qty: 90 TABLET | Refills: 3 | Status: SHIPPED | OUTPATIENT
Start: 2021-12-07

## 2021-12-08 RX ORDER — ZOLPIDEM TARTRATE 10 MG/1
10 TABLET ORAL
Qty: 90 TABLET | Refills: 1 | Status: SHIPPED | OUTPATIENT
Start: 2021-12-08 | End: 2022-10-24

## 2022-01-01 NOTE — TELEPHONE ENCOUNTER
----- Message from Alex Parsons sent at 11/13/2017  1:45 PM EST -----  Regarding: /Telephone  Pt has an appointment with Dr. Marciano Obando at the [facility] and will need a  referral.    Pt appointment is 11.29.17 at 8:30 best contact for the pt is [phone] initiation of breastfeeding/breast milk feeding

## 2022-01-26 ENCOUNTER — OFFICE VISIT (OUTPATIENT)
Dept: INTERNAL MEDICINE CLINIC | Age: 57
End: 2022-01-26
Payer: OTHER GOVERNMENT

## 2022-01-26 VITALS
OXYGEN SATURATION: 96 % | TEMPERATURE: 97.8 F | HEIGHT: 72 IN | DIASTOLIC BLOOD PRESSURE: 70 MMHG | RESPIRATION RATE: 15 BRPM | BODY MASS INDEX: 35.24 KG/M2 | SYSTOLIC BLOOD PRESSURE: 104 MMHG | HEART RATE: 81 BPM | WEIGHT: 260.2 LBS

## 2022-01-26 DIAGNOSIS — Z00.00 WELL ADULT HEALTH CHECK: Primary | ICD-10-CM

## 2022-01-26 DIAGNOSIS — R13.19 INTERMITTENT DYSPHAGIA: ICD-10-CM

## 2022-01-26 DIAGNOSIS — E78.5 HYPERLIPIDEMIA WITH TARGET LDL LESS THAN 130: ICD-10-CM

## 2022-01-26 DIAGNOSIS — I10 ESSENTIAL HYPERTENSION: ICD-10-CM

## 2022-01-26 DIAGNOSIS — R63.5 WEIGHT GAIN: ICD-10-CM

## 2022-01-26 DIAGNOSIS — E29.1 SECONDARY MALE HYPOGONADISM: ICD-10-CM

## 2022-01-26 DIAGNOSIS — G47.00 INSOMNIA, UNSPECIFIED TYPE: ICD-10-CM

## 2022-01-26 LAB
ALBUMIN SERPL-MCNC: 4 G/DL (ref 3.5–5)
ALBUMIN/GLOB SERPL: 1.3 {RATIO} (ref 1.1–2.2)
ALP SERPL-CCNC: 48 U/L (ref 45–117)
ALT SERPL-CCNC: 48 U/L (ref 12–78)
ANION GAP SERPL CALC-SCNC: 5 MMOL/L (ref 5–15)
AST SERPL-CCNC: 21 U/L (ref 15–37)
BILIRUB SERPL-MCNC: 1.1 MG/DL (ref 0.2–1)
BUN SERPL-MCNC: 14 MG/DL (ref 6–20)
BUN/CREAT SERPL: 11 (ref 12–20)
CALCIUM SERPL-MCNC: 9.1 MG/DL (ref 8.5–10.1)
CHLORIDE SERPL-SCNC: 105 MMOL/L (ref 97–108)
CHOLEST SERPL-MCNC: 208 MG/DL
CO2 SERPL-SCNC: 28 MMOL/L (ref 21–32)
CREAT SERPL-MCNC: 1.23 MG/DL (ref 0.7–1.3)
GLOBULIN SER CALC-MCNC: 3.2 G/DL (ref 2–4)
GLUCOSE SERPL-MCNC: 98 MG/DL (ref 65–100)
HDLC SERPL-MCNC: 40 MG/DL
HDLC SERPL: 5.2 {RATIO} (ref 0–5)
LDLC SERPL CALC-MCNC: 144.8 MG/DL (ref 0–100)
POTASSIUM SERPL-SCNC: 4.7 MMOL/L (ref 3.5–5.1)
PROT SERPL-MCNC: 7.2 G/DL (ref 6.4–8.2)
SODIUM SERPL-SCNC: 138 MMOL/L (ref 136–145)
TRIGL SERPL-MCNC: 116 MG/DL (ref ?–150)
TSH SERPL DL<=0.05 MIU/L-ACNC: 2.81 UIU/ML (ref 0.36–3.74)
VLDLC SERPL CALC-MCNC: 23.2 MG/DL

## 2022-01-26 PROCEDURE — 99396 PREV VISIT EST AGE 40-64: CPT | Performed by: INTERNAL MEDICINE

## 2022-01-26 PROCEDURE — 99214 OFFICE O/P EST MOD 30 MIN: CPT | Performed by: INTERNAL MEDICINE

## 2022-01-26 RX ORDER — QUETIAPINE FUMARATE 50 MG/1
50 TABLET, FILM COATED ORAL
Qty: 30 TABLET | Refills: 2 | Status: SHIPPED | OUTPATIENT
Start: 2022-01-26 | End: 2022-04-15

## 2022-01-26 RX ORDER — ZOSTER VACCINE RECOMBINANT, ADJUVANTED 50 MCG/0.5
0.5 KIT INTRAMUSCULAR ONCE
Qty: 0.5 ML | Refills: 1 | Status: SHIPPED | OUTPATIENT
Start: 2022-01-26 | End: 2022-01-26

## 2022-01-26 NOTE — PROGRESS NOTES
Yuki Pearl is a 55 y.o. male  Presenting for his annual checkup and health maintenance review and follow-up    Ongoing stress re: work from home for [employer] Seeing Dr. Alanna Kelsey for hypogonadism. Continues testosterone injections. ED is reasonably controlled with Caverject. Chronic insomnia. Taking Ambien 1/3-1/2 of 10 mg hs chronically and prn trazodone with some help. Trazodone causes vivid dreams. He takes an hour to calm down before bedtime. He does wake up often in the middle the night. He relives attacks at night. History of PTSD. Weight gain  Reports poor diet, lack of exercise    Hypertension  Hypertension ROS: taking medications as instructed, no medication side effects noted, no TIA's, no chest pain on exertion, no dyspnea on exertion, no swelling of ankles     reports that he has never smoked. He has never used smokeless tobacco.    reports no history of alcohol use. BP Readings from Last 2 Encounters:   01/26/22 104/70   01/21/21 123/79     Hyperlipidemia  He stopped taking simvastatin prior to 2021 labs  ROS: taking medications as instructed, no medication side effects noted  No new myalgias, no joint pains, no weakness  No TIA's, no chest pain on exertion, no dyspnea on exertion, no swelling of ankles.    Lab Results   Component Value Date/Time    Cholesterol, total 226 (H) 01/21/2021 09:57 AM    HDL Cholesterol 44 01/21/2021 09:57 AM    LDL, calculated 153.8 (H) 01/21/2021 09:57 AM    VLDL, calculated 28.2 01/21/2021 09:57 AM    Triglyceride 141 01/21/2021 09:57 AM    CHOL/HDL Ratio 5.1 (H) 01/21/2021 09:57 AM       Exercise: moderately active  Diet: generally follows a low fat low cholesterol diet  Health Maintenance   Topic Date Due    Shingrix Vaccine Age 49> (1 of 2) Never done    Lipid Screen  01/21/2026    Colorectal Cancer Screening Combo  07/11/2026    DTaP/Tdap/Td series (3 - Td or Tdap) 01/21/2031    Hepatitis C Screening  Completed    Flu Vaccine  Completed    COVID-19 Vaccine  Completed    Pneumococcal 0-64 years  Aged Out     Health Maintenance reviewed  Last digital rectal exam:  none  Lab Results   Component Value Date/Time    Prostate Specific Ag 0.6 01/21/2021 09:57 AM    Prostate Specific Ag 0.6 01/09/2020 11:25 AM    Prostate Specific Ag 0.7 12/06/2018 10:57 AM    Prostate Specific Ag 0.4 06/08/2010 03:58 PM    Prostate Specific Ag 0.5 11/06/2009 08:31 AM    Prostate Specific Ag 0.5 07/14/2009 10:38 AM       Vaccinations reviewed  Immunization History   Administered Date(s) Administered    COVID-19, Pfizer Purple top, DILUTE for use, 12+ yrs, 30mcg/0.3mL dose 04/03/2021, 04/24/2021, 10/25/2021    Hep A and Hep B Vaccine 11/30/2017    Influenza Vaccine 11/01/2012, 09/01/2013, 11/01/2015, 10/10/2016, 11/28/2017, 12/07/2017, 10/05/2018, 11/28/2018, 10/01/2019, 10/07/2019, 09/24/2020, 11/01/2021    Pneumococcal Polysaccharide (PPSV-23) 06/20/2013    Pneumococcal Vaccine (Unspecified Type) 06/30/2013    TDAP Vaccine 10/01/2010    Tdap 01/21/2021       Past Medical History:   Diagnosis Date    Atrial flutter ([facility] [address]     saw Dr. Lemuel Trevino. EP Ablation 7/30/13    Blepharitis of upper and lower eyelids of both eyes 11/30/2017    Dr. Evita Monet    Chronic right hip pain     DDD (degenerative disc disease), lumbar 6/2006    [facility] (erectile dysfunction)     Hearing loss, sensorineural     HTN (hypertension) 2006    Hyperlipidemia LDL goal < 130 2006    Insomnia     Lumbar disc disease with radiculopathy 12/2010    radiculopathy    ELIZABETH (obstructive sleep apnea) 4/09    [facility], Dr. Deysi Baltazar, CPAP    PTSD (post-traumatic stress disorder)     sees counselor    RLS (restless legs syndrome) 4/09    sleep only    Rosacea     Secondary male hypogonadism     MRI normal 8/2009.   Dr. Samantha Noss    Varicose vein of leg       has a past surgical history that includes hx hernia repair; hx afib ablation (07/30/2013); and colonoscopy (N/A, 7/11/2016). Viagra [sildenafil]   Current Outpatient Medications   Medication Sig    Shingrix, PF, 50 mcg/0.5 mL susr injection 0.5 mL by IntraMUSCular route once for 1 dose. Receive 2nd dose in 2-6 months. For Shingles (Zoster) prevention    zolpidem (Ambien) 10 mg tablet Take 1 Tablet by mouth nightly as needed for Sleep. Max Daily Amount: 10 mg.    telmisartan (MICARDIS) 40 mg tablet TAKE 1 TABLET DAILY    metoprolol tartrate (LOPRESSOR) 25 mg tablet TAKE 1 TABLET TWICE A DAY    Caverject Impulse 20 mcg kit INJECT 20 MCG BY INTRACAVERNOSAL ROUTE DAILY AS NEEDED    traZODone (DESYREL) 50 mg tablet TAKE 1 TABLET NIGHTLY FOR INSOMNIA    BD INSULIN SYRINGE ULTRA-FINE 0.5 mL 30 gauge x 1/2\" syrg FOR USE WITH REMY JET AS DIRECTED BY DOCTOR    BD LUER-MARTHA SYRINGE 3 mL 21 x 1\" syrg     ASPIRIN (ASPIR-81 PO) Take 81 mg by mouth daily.  testosterone cypionate (DEPOTESTOTERONE CYPIONATE) 200 mg/mL injection by IntraMUSCular route once. Every 10 days     No current facility-administered medications for this visit. SOCIAL HX:  reports that he has never smoked. He has never used smokeless tobacco. He reports that he does not drink alcohol and does not use drugs. FAMILY HX: family history includes Hypertension in his father. Review of Systems - History obtained from the patient  General ROS: negative for - night sweats, weight gain or weight loss  Cardiovascular ROS: no chest pain, dyspnea on exertion, edema    Physical exam  Blood pressure 104/70, pulse 81, temperature 97.8 °F (36.6 °C), temperature source Oral, resp. rate 15, height 6' (1.829 m), weight 260 lb 3.2 oz (118 kg), SpO2 96 %. Wt Readings from Last 3 Encounters:   01/26/22 260 lb 3.2 oz (118 kg)   01/21/21 254 lb 6.4 oz (115.4 kg)   01/09/20 243 lb (110.2 kg)     He appears well, alert and oriented x 3, pleasant and cooperative. Vitals as noted. No rashes or significant lesions.   Neck supple and free of adenopathy, or masses. No thyromegaly or carotid bruits. Cranial nerves normal. Lungs are clear to auscultation. Heart sounds are normal with no murmurs, clicks, gallops or rubs. Abdomen is soft, perhaps mild tenderness and fullness in the right upper quadrant but no obvious palpable mass. I question whether the liver is slightly enlarged. Extremities, peripheral pulses and reflexes are normal.  . RECTAL/PROSTATE EXAM: DECLINES. Skin is without rashes or suspicious lesions. Diagnoses and all orders for this visit:    1. Well adult health check  Recommend shingrix  -     Shingrix, PF, 50 mcg/0.5 mL susr injection; 0.5 mL by IntraMUSCular route once for 1 dose. Receive 2nd dose in 2-6 months. For Shingles (Zoster) prevention    2. Weight gain  The patient is asked to make an attempt to improve diet and exercise patterns to aid in medical management of this problem. Needs some motivation to improve this  -     TSH 3RD GENERATION; Future    3. Insomnia, unspecified type  Uncontrolled on ambien and trazodone side effects  PTSD efffects  -     Try QUEtiapine (SEROquel) 50 mg tablet; Take 1 Tablet by mouth nightly as needed (insomnia. replaces trazodone 1/26/22). 4. Essential hypertension  This condition is controlled on current medication regimen as written in medication list.  Medications refilled. -     METABOLIC PANEL, COMPREHENSIVE; Future  -     LIPID PANEL; Future    5. Intermittent dysphagia  Unclear etiology? Spasm? Stricture? GERD? Solids only. Consider GI referral   -     XR BA SWALLOW ESOPHOGRAM; Future    6. Hyperlipidemia with target LDL less than 130  Off statin over 1 year  -     LIPID PANEL; Future    7.  Secondary male hypogonadism  This condition is controlled on current medication regimen as written in medication list.  Medications refilled  Per endocrinology        The patient is asked to make an attempt to improve diet and exercise patterns  Avoid tobacco products, excessive alcohol    Return for yearly wellness visits    An After Visit Summary was printed and given to the patient. Discussed the patient's BMI with him. The BMI follow up plan is as follows:     dietary management education, guidance, and counseling  encourage exercise  monitor weight  prescribed dietary intake    An After Visit Summary was printed and given to the patient.

## 2022-02-10 ENCOUNTER — HOSPITAL ENCOUNTER (OUTPATIENT)
Dept: GENERAL RADIOLOGY | Age: 57
Discharge: HOME | End: 2022-02-10
Attending: INTERNAL MEDICINE
Payer: OTHER GOVERNMENT

## 2022-02-10 DIAGNOSIS — R13.19 INTERMITTENT DYSPHAGIA: ICD-10-CM

## 2022-02-10 PROCEDURE — 74221 X-RAY XM ESOPHAGUS 2CNTRST: CPT

## 2022-03-19 PROBLEM — H01.00A BLEPHARITIS OF UPPER AND LOWER EYELIDS OF BOTH EYES: Status: ACTIVE | Noted: 2017-11-30

## 2022-03-19 PROBLEM — H01.00B BLEPHARITIS OF UPPER AND LOWER EYELIDS OF BOTH EYES: Status: ACTIVE | Noted: 2017-11-30

## 2022-04-12 RX ORDER — ALPROSTADIL 20 UG/.5ML
INJECTION, POWDER, LYOPHILIZED, FOR SOLUTION INTRACAVERNOUS
Qty: 20 KIT | Refills: 5 | Status: SHIPPED | OUTPATIENT
Start: 2022-04-12

## 2022-04-15 DIAGNOSIS — G47.00 INSOMNIA, UNSPECIFIED TYPE: ICD-10-CM

## 2022-04-15 RX ORDER — QUETIAPINE FUMARATE 50 MG/1
TABLET, FILM COATED ORAL
Qty: 30 TABLET | Refills: 2 | Status: SHIPPED | OUTPATIENT
Start: 2022-04-15 | End: 2022-07-05

## 2022-07-05 DIAGNOSIS — G47.00 INSOMNIA, UNSPECIFIED TYPE: ICD-10-CM

## 2022-07-05 RX ORDER — QUETIAPINE FUMARATE 50 MG/1
TABLET, FILM COATED ORAL
Qty: 30 TABLET | Refills: 2 | Status: SHIPPED | OUTPATIENT
Start: 2022-07-05 | End: 2022-10-21

## 2022-10-21 DIAGNOSIS — G47.00 INSOMNIA, UNSPECIFIED TYPE: ICD-10-CM

## 2022-10-21 RX ORDER — QUETIAPINE FUMARATE 50 MG/1
TABLET, FILM COATED ORAL
Qty: 30 TABLET | Refills: 2 | Status: SHIPPED | OUTPATIENT
Start: 2022-10-21

## 2022-10-24 DIAGNOSIS — G47.00 INSOMNIA, UNSPECIFIED TYPE: ICD-10-CM

## 2022-10-24 RX ORDER — ZOLPIDEM TARTRATE 10 MG/1
TABLET ORAL
Qty: 90 TABLET | Refills: 1 | Status: SHIPPED | OUTPATIENT
Start: 2022-10-24

## 2022-11-22 ENCOUNTER — PATIENT MESSAGE (OUTPATIENT)
Dept: INTERNAL MEDICINE CLINIC | Age: 57
End: 2022-11-22

## 2022-11-22 DIAGNOSIS — I10 ESSENTIAL HYPERTENSION: ICD-10-CM

## 2022-11-22 DIAGNOSIS — I48.92 ATRIAL FLUTTER, UNSPECIFIED TYPE (HCC): ICD-10-CM

## 2022-11-23 RX ORDER — METOPROLOL TARTRATE 25 MG/1
TABLET, FILM COATED ORAL
Qty: 180 TABLET | Refills: 3 | Status: SHIPPED | OUTPATIENT
Start: 2022-11-23

## 2022-11-23 NOTE — TELEPHONE ENCOUNTER
From: Roque Garrett  To: Yolanda Preston MD  Sent: 11/22/2022 12:25 PM EST  Subject: Requesting 90day prescription        Sir,    I ran out of metoprolol tartrate 25 mg tablet. [facility], but looks like the prescription got dropped for some reason. Would it be possible to get a 90day supply for immediate refill from the following local pharmacy?     [address] [place] [address] [address] [facility], [facility] [address] [facility]  [phone]    Thank you,  Matilde Kathleen

## 2022-12-02 DIAGNOSIS — I10 ESSENTIAL HYPERTENSION: ICD-10-CM

## 2022-12-02 RX ORDER — TELMISARTAN 40 MG/1
TABLET ORAL
Qty: 90 TABLET | Refills: 3 | Status: SHIPPED | OUTPATIENT
Start: 2022-12-02

## 2023-01-16 DIAGNOSIS — G47.00 INSOMNIA, UNSPECIFIED TYPE: ICD-10-CM

## 2023-01-16 RX ORDER — QUETIAPINE FUMARATE 50 MG/1
TABLET, FILM COATED ORAL
Qty: 30 TABLET | Refills: 2 | Status: SHIPPED | OUTPATIENT
Start: 2023-01-16

## 2023-02-28 DIAGNOSIS — I10 ESSENTIAL HYPERTENSION: ICD-10-CM

## 2023-02-28 DIAGNOSIS — I48.92 ATRIAL FLUTTER, UNSPECIFIED TYPE (HCC): ICD-10-CM

## 2023-02-28 RX ORDER — METOPROLOL TARTRATE 25 MG/1
TABLET, FILM COATED ORAL
Qty: 180 TABLET | Refills: 3 | Status: SHIPPED | OUTPATIENT
Start: 2023-02-28 | End: 2023-02-28 | Stop reason: SDUPTHER

## 2023-02-28 RX ORDER — METOPROLOL TARTRATE 25 MG/1
TABLET, FILM COATED ORAL
Qty: 180 TABLET | Refills: 3 | Status: SHIPPED | OUTPATIENT
Start: 2023-02-28 | End: 2023-03-01 | Stop reason: SDUPTHER

## 2023-03-01 DIAGNOSIS — I10 ESSENTIAL HYPERTENSION: ICD-10-CM

## 2023-03-01 DIAGNOSIS — I48.92 ATRIAL FLUTTER, UNSPECIFIED TYPE (HCC): ICD-10-CM

## 2023-03-01 RX ORDER — METOPROLOL TARTRATE 25 MG/1
TABLET, FILM COATED ORAL
Qty: 180 TABLET | Refills: 3 | Status: SHIPPED | OUTPATIENT
Start: 2023-03-01

## 2023-03-01 NOTE — TELEPHONE ENCOUNTER
Requested Prescriptions     Pending Prescriptions Disp Refills    metoprolol tartrate (LOPRESSOR) 25 mg tablet 180 Tablet 3     Sig: TAKE 1 TABLET TWICE A DAY       Allergies   Allergen Reactions    Viagra [Sildenafil] Other (comments)     Visual changes       Last visit with ordering provider: 1/26/23  Next visit with ordering provider: 5/44/77  Is order duplicate: yes, signed 3/17/98     Current Outpatient Medications   Medication Instructions    ASPIRIN (ASPIR-81 PO) 81 mg, Oral, DAILY    BD INSULIN SYRINGE ULTRA-FINE 0.5 mL 30 gauge x 1/2\" syrg FOR USE WITH REMY JET AS DIRECTED BY DOCTOR    BD LUER-MARTHA SYRINGE 3 mL 21 x 1\" syrg No dose, route, or frequency recorded. Caverject Impulse 20 mcg kit INJECT 20 MCG BY INTRACAVERNOSAL ROUTE DAILY AS NEEDED    metoprolol tartrate (LOPRESSOR) 25 mg tablet TAKE 1 TABLET TWICE A DAY    QUEtiapine (SEROquel) 50 mg tablet TAKE 1 TABLET BY MOUTH NIGHTLY AS NEEDED    telmisartan (MICARDIS) 40 mg tablet TAKE 1 TABLET DAILY    testosterone cypionate (DEPOTESTOTERONE CYPIONATE) 200 mg/mL injection IntraMUSCular, ONCE, Every 10 days    zolpidem (AMBIEN) 10 mg tablet TAKE 1 TABLET BY MOUTH NIGHTLY AS NEEDED FOR SLEEP.  MAXIMUM DAILY DOSE 1 TABLET       Signed By: Moriah Wilson     March 1, 2023

## 2023-03-16 DIAGNOSIS — G47.00 INSOMNIA, UNSPECIFIED TYPE: ICD-10-CM

## 2023-03-16 RX ORDER — QUETIAPINE FUMARATE 50 MG/1
TABLET, FILM COATED ORAL
Qty: 30 TABLET | Refills: 2 | Status: SHIPPED | OUTPATIENT
Start: 2023-03-16

## 2023-05-10 DIAGNOSIS — F51.01 PRIMARY INSOMNIA: Primary | ICD-10-CM

## 2023-05-11 RX ORDER — QUETIAPINE FUMARATE 50 MG/1
TABLET, FILM COATED ORAL
Qty: 30 TABLET | Refills: 11 | Status: SHIPPED | OUTPATIENT
Start: 2023-05-11

## 2023-10-06 DIAGNOSIS — F51.01 PRIMARY INSOMNIA: Primary | ICD-10-CM

## 2023-10-06 RX ORDER — ZOLPIDEM TARTRATE 10 MG/1
10 TABLET ORAL NIGHTLY PRN
Qty: 90 TABLET | Refills: 0 | Status: SHIPPED | OUTPATIENT
Start: 2023-10-06 | End: 2024-04-03

## 2023-11-27 DIAGNOSIS — I10 ESSENTIAL (PRIMARY) HYPERTENSION: Primary | ICD-10-CM

## 2023-11-27 RX ORDER — TELMISARTAN 40 MG/1
TABLET ORAL
Qty: 90 TABLET | Refills: 3 | Status: SHIPPED | OUTPATIENT
Start: 2023-11-27

## 2024-02-07 DIAGNOSIS — F51.01 PRIMARY INSOMNIA: ICD-10-CM

## 2024-02-08 RX ORDER — ZOLPIDEM TARTRATE 10 MG/1
10 TABLET ORAL NIGHTLY PRN
Qty: 90 TABLET | Refills: 0 | Status: SHIPPED | OUTPATIENT
Start: 2024-02-08 | End: 2024-05-08

## 2024-02-08 NOTE — TELEPHONE ENCOUNTER
Chief Complaint   Patient presents with    Medication Refill       Requested Prescriptions     Pending Prescriptions Disp Refills    zolpidem (AMBIEN) 10 MG tablet [Pharmacy Med Name: ZOLPIDEM TARTRATE 10 MG TABLET] 90 tablet 0     Sig: TAKE 1 TABLET BY MOUTH NIGHTLY AS NEEDED FOR SLEEP MAXIMUM DAILY DOSE OF 1 TABLET PER DAY       Allergies:  Allergies   Allergen Reactions    Sildenafil Other (See Comments)     Visual changes       Last visit with clinic:  4/12/2023   Next visit with clinic: Visit date not found     Last visit with this provider: 4/12/2023   Next Visit with this provider: Visit date not found    Signed by Ari BOOKER  02/08/24  7:39 AM

## 2024-03-26 DIAGNOSIS — I10 ESSENTIAL (PRIMARY) HYPERTENSION: Primary | ICD-10-CM

## 2024-05-06 RX ORDER — ALPROSTADIL 20 UG/.5ML
INJECTION, POWDER, LYOPHILIZED, FOR SOLUTION INTRACAVERNOUS
Qty: 24 EACH | Refills: 3 | Status: SHIPPED | OUTPATIENT
Start: 2024-05-06

## 2024-05-24 ENCOUNTER — OFFICE VISIT (OUTPATIENT)
Age: 59
End: 2024-05-24

## 2024-05-24 VITALS
HEART RATE: 79 BPM | BODY MASS INDEX: 37.36 KG/M2 | HEIGHT: 72 IN | SYSTOLIC BLOOD PRESSURE: 115 MMHG | RESPIRATION RATE: 20 BRPM | TEMPERATURE: 98 F | WEIGHT: 275.8 LBS | OXYGEN SATURATION: 94 % | DIASTOLIC BLOOD PRESSURE: 80 MMHG

## 2024-05-24 DIAGNOSIS — Z00.00 PREVENTATIVE HEALTH CARE: ICD-10-CM

## 2024-05-24 DIAGNOSIS — I10 PRIMARY HYPERTENSION: ICD-10-CM

## 2024-05-24 DIAGNOSIS — F51.01 PRIMARY INSOMNIA: ICD-10-CM

## 2024-05-24 DIAGNOSIS — Z11.59 NEED FOR HEPATITIS C SCREENING TEST: ICD-10-CM

## 2024-05-24 DIAGNOSIS — E78.5 HYPERLIPIDEMIA WITH TARGET LDL LESS THAN 130: ICD-10-CM

## 2024-05-24 DIAGNOSIS — Z11.4 SCREENING FOR HIV WITHOUT PRESENCE OF RISK FACTORS: ICD-10-CM

## 2024-05-24 DIAGNOSIS — Z11.59 NEED FOR HEPATITIS B SCREENING TEST: Primary | ICD-10-CM

## 2024-05-24 DIAGNOSIS — E29.1 SECONDARY MALE HYPOGONADISM: ICD-10-CM

## 2024-05-24 DIAGNOSIS — D75.1 ERYTHROCYTOSIS: ICD-10-CM

## 2024-05-24 DIAGNOSIS — F43.10 PTSD (POST-TRAUMATIC STRESS DISORDER): ICD-10-CM

## 2024-05-24 DIAGNOSIS — Z11.59 NEED FOR HEPATITIS B SCREENING TEST: ICD-10-CM

## 2024-05-24 LAB
ALBUMIN SERPL-MCNC: 3.9 G/DL (ref 3.5–5)
ALBUMIN/GLOB SERPL: 1.2 (ref 1.1–2.2)
ALP SERPL-CCNC: 52 U/L (ref 45–117)
ALT SERPL-CCNC: 42 U/L (ref 12–78)
ANION GAP SERPL CALC-SCNC: 3 MMOL/L (ref 5–15)
AST SERPL-CCNC: 21 U/L (ref 15–37)
BILIRUB SERPL-MCNC: 1.2 MG/DL (ref 0.2–1)
BUN SERPL-MCNC: 8 MG/DL (ref 6–20)
BUN/CREAT SERPL: 7 (ref 12–20)
CALCIUM SERPL-MCNC: 8.7 MG/DL (ref 8.5–10.1)
CHLORIDE SERPL-SCNC: 106 MMOL/L (ref 97–108)
CHOLEST SERPL-MCNC: 197 MG/DL
CO2 SERPL-SCNC: 27 MMOL/L (ref 21–32)
CREAT SERPL-MCNC: 1.17 MG/DL (ref 0.7–1.3)
ERYTHROCYTE [DISTWIDTH] IN BLOOD BY AUTOMATED COUNT: 12.7 % (ref 11.5–14.5)
GLOBULIN SER CALC-MCNC: 3.2 G/DL (ref 2–4)
GLUCOSE SERPL-MCNC: 89 MG/DL (ref 65–100)
HBV SURFACE AB SER QL: NONREACTIVE
HBV SURFACE AB SER-ACNC: <3.1 MIU/ML
HCT VFR BLD AUTO: 54.5 % (ref 36.6–50.3)
HCV AB SER IA-ACNC: 0.04 INDEX
HCV AB SERPL QL IA: NONREACTIVE
HDLC SERPL-MCNC: 42 MG/DL
HDLC SERPL: 4.7 (ref 0–5)
HGB BLD-MCNC: 18.8 G/DL (ref 12.1–17)
HIV 1+2 AB+HIV1 P24 AG SERPL QL IA: NONREACTIVE
HIV 1/2 RESULT COMMENT: NORMAL
LDLC SERPL CALC-MCNC: 134 MG/DL (ref 0–100)
MCH RBC QN AUTO: 31.5 PG (ref 26–34)
MCHC RBC AUTO-ENTMCNC: 34.5 G/DL (ref 30–36.5)
MCV RBC AUTO: 91.3 FL (ref 80–99)
NRBC # BLD: 0 K/UL (ref 0–0.01)
NRBC BLD-RTO: 0 PER 100 WBC
PLATELET # BLD AUTO: 201 K/UL (ref 150–400)
PMV BLD AUTO: 10.2 FL (ref 8.9–12.9)
POTASSIUM SERPL-SCNC: 4.5 MMOL/L (ref 3.5–5.1)
PROT SERPL-MCNC: 7.1 G/DL (ref 6.4–8.2)
PSA SERPL-MCNC: 0.5 NG/ML (ref 0.01–4)
RBC # BLD AUTO: 5.97 M/UL (ref 4.1–5.7)
SODIUM SERPL-SCNC: 136 MMOL/L (ref 136–145)
TRIGL SERPL-MCNC: 105 MG/DL
VLDLC SERPL CALC-MCNC: 21 MG/DL
WBC # BLD AUTO: 7.7 K/UL (ref 4.1–11.1)

## 2024-05-24 RX ORDER — QUETIAPINE FUMARATE 50 MG/1
50 TABLET, FILM COATED ORAL
Qty: 90 TABLET | Refills: 4 | Status: SHIPPED | OUTPATIENT
Start: 2024-05-24 | End: 2024-05-24 | Stop reason: ALTCHOICE

## 2024-05-24 RX ORDER — ZOLPIDEM TARTRATE 10 MG/1
10 TABLET ORAL NIGHTLY PRN
Qty: 90 TABLET | Refills: 1 | Status: SHIPPED | OUTPATIENT
Start: 2024-05-24 | End: 2024-11-20

## 2024-05-24 RX ORDER — ALPROSTADIL 20 UG/.5ML
20 INJECTION, POWDER, LYOPHILIZED, FOR SOLUTION INTRACAVERNOUS PRN
Qty: 18 EACH | Refills: 5 | Status: SHIPPED | OUTPATIENT
Start: 2024-05-24

## 2024-05-24 ASSESSMENT — PATIENT HEALTH QUESTIONNAIRE - PHQ9
SUM OF ALL RESPONSES TO PHQ QUESTIONS 1-9: 0
SUM OF ALL RESPONSES TO PHQ QUESTIONS 1-9: 0
2. FEELING DOWN, DEPRESSED OR HOPELESS: NOT AT ALL
1. LITTLE INTEREST OR PLEASURE IN DOING THINGS: NOT AT ALL
SUM OF ALL RESPONSES TO PHQ9 QUESTIONS 1 & 2: 0
SUM OF ALL RESPONSES TO PHQ QUESTIONS 1-9: 0
SUM OF ALL RESPONSES TO PHQ QUESTIONS 1-9: 0

## 2024-05-24 NOTE — PROGRESS NOTES
Abe Gutierrez is a 57 y.o. male  Presenting for his annual checkup and health maintenance review and follow-up    Ongoing stress re: work from home for [employer].      Seeing Dr. Dotson for hypogonadism.  Continues testosterone injections.    ED is reasonably controlled with Caverject, but it is on back-order    Chronic insomnia.    Taking Ambien 1/3-1/2 of 10 mg hs chronically and prn seroquel  He does not wake up often in the middle the night.  He relives attacks at night.   History of PTSD.    Weight is stable.    Reports poor diet, lack of exercise  Wt Readings from Last 3 Encounters:   05/24/24 125.1 kg (275 lb 12.8 oz)   04/12/23 116.3 kg (256 lb 6.4 oz)   01/26/22 118 kg (260 lb 3.2 oz)     Hypertension  Hypertension ROS: taking medications as instructed, no medication side effects noted, no TIA's, no chest pain on exertion, no dyspnea on exertion, no swelling of ankles     reports that he has never smoked. He has never used smokeless tobacco.    reports no history of alcohol use.   Blood pressure 115/80, pulse 79, temperature 98 °F (36.7 °C), resp. rate 20, height 1.829 m (6'), weight 125.1 kg (275 lb 12.8 oz), SpO2 94 %.    Hyperlipidemia  He stopped taking simvastatin prior to 2021 labs  ROS: taking medications as instructed, no medication side effects noted  No new myalgias, no joint pains, no weakness  No TIA's, no chest pain on exertion, no dyspnea on exertion, no swelling of ankles.   Lab Results   Component Value Date    CHOL 218 (H) 04/12/2023    TRIG 109 04/12/2023    HDL 44 04/12/2023    .2 (H) 04/12/2023    VLDL 21.8 04/12/2023    CHOLHDLRATIO 5.0 04/12/2023     Exercise: not active  Diet: generally follows a low fat low cholesterol diet      Immunization History   Administered Date(s) Administered    COVID-19, PFIZER PURPLE top, DILUTE for use, (age 12 y+), 30mcg/0.3mL 04/03/2021, 04/24/2021, 10/25/2021    Hep A-Hep B, TWINRIX, (age 18y+), IM, 1mL 11/30/2017    Influenza

## 2024-05-24 NOTE — PROGRESS NOTES
Identified pt with two pt identifiers(name and ). Reviewed record in preparation for visit and have obtained necessary documentation. All patient medications has been reviewed.  No chief complaint on file.      Vitals:    24 0854   BP: 115/80   Pulse: 79   Resp: 20   Temp: 98 °F (36.7 °C)   SpO2: 94%                   Coordination of Care Questionnaire:   1) Have you been to an emergency room, urgent care, or hospitalized since your last visit?   no       2. Have seen or consulted any other health care provider since your last visit? no        Patient is accompanied by self I have received verbal consent from Abe Gutierrez to discuss any/all medical information while they are present in the room.

## 2024-05-29 RX ORDER — ALPROSTADIL 20 UG/ML
20 INJECTION, POWDER, LYOPHILIZED, FOR SOLUTION INTRACAVERNOUS DAILY PRN
Qty: 18 EACH | Refills: 5 | Status: SHIPPED | OUTPATIENT
Start: 2024-05-29

## 2024-09-18 ENCOUNTER — TELEPHONE (OUTPATIENT)
Age: 59
End: 2024-09-18

## 2024-09-30 RX ORDER — ALPROSTADIL 20 UG/ML
20 INJECTION, POWDER, LYOPHILIZED, FOR SOLUTION INTRACAVERNOUS DAILY PRN
Qty: 6 EACH | Refills: 0 | Status: SHIPPED | OUTPATIENT
Start: 2024-09-30 | End: 2024-10-30

## 2024-11-22 DIAGNOSIS — I10 ESSENTIAL (PRIMARY) HYPERTENSION: ICD-10-CM

## 2024-11-23 RX ORDER — TELMISARTAN 40 MG/1
TABLET ORAL
Qty: 90 TABLET | Refills: 0 | Status: SHIPPED | OUTPATIENT
Start: 2024-11-23

## 2025-01-02 ENCOUNTER — PATIENT MESSAGE (OUTPATIENT)
Facility: CLINIC | Age: 60
End: 2025-01-02

## 2025-01-02 DIAGNOSIS — N52.9 ERECTILE DYSFUNCTION, UNSPECIFIED ERECTILE DYSFUNCTION TYPE: Primary | ICD-10-CM

## 2025-01-02 RX ORDER — ALPROSTADIL 20 UG/ML
20 INJECTION, POWDER, LYOPHILIZED, FOR SOLUTION INTRACAVERNOUS DAILY PRN
Qty: 6 EACH | Refills: 0 | Status: SHIPPED | OUTPATIENT
Start: 2025-01-02 | End: 2025-02-01

## 2025-01-03 DIAGNOSIS — N52.9 ERECTILE DYSFUNCTION, UNSPECIFIED ERECTILE DYSFUNCTION TYPE: ICD-10-CM

## 2025-01-18 DIAGNOSIS — N52.9 ERECTILE DYSFUNCTION, UNSPECIFIED ERECTILE DYSFUNCTION TYPE: ICD-10-CM

## 2025-01-20 RX ORDER — ALPROSTADIL 20 UG/ML
INJECTION, POWDER, LYOPHILIZED, FOR SOLUTION INTRACAVERNOUS
Qty: 6 EACH | Refills: 0 | Status: SHIPPED | OUTPATIENT
Start: 2025-01-20

## 2025-01-24 DIAGNOSIS — F43.10 PTSD (POST-TRAUMATIC STRESS DISORDER): Primary | ICD-10-CM

## 2025-01-24 DIAGNOSIS — F51.01 PRIMARY INSOMNIA: ICD-10-CM

## 2025-01-24 RX ORDER — ZOLPIDEM TARTRATE 10 MG/1
TABLET ORAL
Qty: 90 TABLET | Refills: 1 | Status: SHIPPED | OUTPATIENT
Start: 2025-01-24 | End: 2025-07-23

## 2025-01-28 DIAGNOSIS — N52.9 ERECTILE DYSFUNCTION, UNSPECIFIED ERECTILE DYSFUNCTION TYPE: ICD-10-CM

## 2025-01-28 RX ORDER — ALPROSTADIL 20 UG/ML
INJECTION, POWDER, LYOPHILIZED, FOR SOLUTION INTRACAVERNOUS
Refills: 0 | OUTPATIENT
Start: 2025-01-28

## 2025-02-05 DIAGNOSIS — N52.9 ERECTILE DYSFUNCTION, UNSPECIFIED ERECTILE DYSFUNCTION TYPE: ICD-10-CM

## 2025-02-05 RX ORDER — ALPROSTADIL 20 UG/ML
INJECTION, POWDER, LYOPHILIZED, FOR SOLUTION INTRACAVERNOUS
Qty: 6 EACH | Refills: 0 | Status: SHIPPED | OUTPATIENT
Start: 2025-02-05 | End: 2025-02-07 | Stop reason: SDUPTHER

## 2025-02-07 DIAGNOSIS — N52.9 ERECTILE DYSFUNCTION, UNSPECIFIED ERECTILE DYSFUNCTION TYPE: ICD-10-CM

## 2025-02-07 RX ORDER — ALPROSTADIL 20 UG/ML
INJECTION, POWDER, LYOPHILIZED, FOR SOLUTION INTRACAVERNOUS
Qty: 6 EACH | Refills: 5 | Status: SHIPPED | OUTPATIENT
Start: 2025-02-07 | End: 2025-03-09

## 2025-02-21 DIAGNOSIS — I10 ESSENTIAL (PRIMARY) HYPERTENSION: ICD-10-CM

## 2025-02-21 RX ORDER — TELMISARTAN 40 MG/1
TABLET ORAL
Qty: 90 TABLET | Refills: 3 | Status: SHIPPED | OUTPATIENT
Start: 2025-02-21

## 2025-03-21 DIAGNOSIS — I10 ESSENTIAL (PRIMARY) HYPERTENSION: ICD-10-CM

## 2025-03-21 RX ORDER — METOPROLOL TARTRATE 25 MG/1
25 TABLET, FILM COATED ORAL 2 TIMES DAILY
Qty: 180 TABLET | Refills: 3 | Status: SHIPPED | OUTPATIENT
Start: 2025-03-21

## 2025-05-28 ENCOUNTER — OFFICE VISIT (OUTPATIENT)
Facility: CLINIC | Age: 60
End: 2025-05-28
Payer: OTHER GOVERNMENT

## 2025-05-28 VITALS
RESPIRATION RATE: 15 BRPM | DIASTOLIC BLOOD PRESSURE: 59 MMHG | WEIGHT: 231 LBS | HEART RATE: 105 BPM | OXYGEN SATURATION: 98 % | TEMPERATURE: 97.8 F | SYSTOLIC BLOOD PRESSURE: 111 MMHG | HEIGHT: 72 IN | BODY MASS INDEX: 31.29 KG/M2

## 2025-05-28 DIAGNOSIS — L71.9 ROSACEA: ICD-10-CM

## 2025-05-28 DIAGNOSIS — N52.9 ERECTILE DYSFUNCTION, UNSPECIFIED ERECTILE DYSFUNCTION TYPE: ICD-10-CM

## 2025-05-28 DIAGNOSIS — Z00.00 PREVENTATIVE HEALTH CARE: ICD-10-CM

## 2025-05-28 DIAGNOSIS — I10 PRIMARY HYPERTENSION: ICD-10-CM

## 2025-05-28 DIAGNOSIS — Z00.00 PREVENTATIVE HEALTH CARE: Primary | ICD-10-CM

## 2025-05-28 DIAGNOSIS — E29.1 SECONDARY MALE HYPOGONADISM: ICD-10-CM

## 2025-05-28 DIAGNOSIS — E78.5 HYPERLIPIDEMIA WITH TARGET LDL LESS THAN 130: ICD-10-CM

## 2025-05-28 PROCEDURE — 99213 OFFICE O/P EST LOW 20 MIN: CPT | Performed by: INTERNAL MEDICINE

## 2025-05-28 PROCEDURE — 3074F SYST BP LT 130 MM HG: CPT | Performed by: INTERNAL MEDICINE

## 2025-05-28 PROCEDURE — 99396 PREV VISIT EST AGE 40-64: CPT | Performed by: INTERNAL MEDICINE

## 2025-05-28 PROCEDURE — 3078F DIAST BP <80 MM HG: CPT | Performed by: INTERNAL MEDICINE

## 2025-05-28 RX ORDER — TIRZEPATIDE 2.5 MG/.5ML
INJECTION, SOLUTION SUBCUTANEOUS
COMMUNITY

## 2025-05-28 RX ORDER — ALPROSTADIL 20 UG/ML
INJECTION, POWDER, LYOPHILIZED, FOR SOLUTION INTRACAVERNOUS
Qty: 6 EACH | Refills: 11 | Status: SHIPPED | OUTPATIENT
Start: 2025-05-28 | End: 2025-06-27

## 2025-05-28 RX ORDER — METRONIDAZOLE TOPICAL 7.5 MG/G
GEL TOPICAL
Qty: 45 G | Refills: 3 | Status: SHIPPED | OUTPATIENT
Start: 2025-05-28

## 2025-05-28 RX ORDER — MINOCYCLINE HYDROCHLORIDE 50 MG/1
50 CAPSULE ORAL DAILY
Qty: 30 CAPSULE | Refills: 0 | Status: SHIPPED | OUTPATIENT
Start: 2025-05-28

## 2025-05-28 ASSESSMENT — PATIENT HEALTH QUESTIONNAIRE - PHQ9
SUM OF ALL RESPONSES TO PHQ QUESTIONS 1-9: 0
2. FEELING DOWN, DEPRESSED OR HOPELESS: NOT AT ALL
SUM OF ALL RESPONSES TO PHQ QUESTIONS 1-9: 0
1. LITTLE INTEREST OR PLEASURE IN DOING THINGS: NOT AT ALL
SUM OF ALL RESPONSES TO PHQ QUESTIONS 1-9: 0
SUM OF ALL RESPONSES TO PHQ QUESTIONS 1-9: 0

## 2025-05-28 NOTE — PROGRESS NOTES
Identified pt with two pt identifiers(name and ). Reviewed record in preparation for visit and have obtained necessary documentation. All patient medications has been reviewed.  Chief Complaint   Patient presents with    Annual Exam     *Immunizations updated if available*    Health Maintenance Due   Topic    Pneumococcal 50+ years Vaccine (2 of 2 - PCV)    Hepatitis B vaccine (2 of 3 - Hep B Twinrix 3-dose series)    COVID-19 Vaccine ( season)    Depression Screen      Health Maintenance Review: Patient reminded of \"due or due soon\" health maintenance. I have asked the patient to contact his/her primary care provider (PCP) for follow-up on his/her health maintenance.    Wt Readings from Last 3 Encounters:   25 104.8 kg (231 lb)   24 125.1 kg (275 lb 12.8 oz)   23 116.3 kg (256 lb 6.4 oz)     Temp Readings from Last 3 Encounters:   25 97.8 °F (36.6 °C) (Oral)   24 98 °F (36.7 °C)     BP Readings from Last 3 Encounters:   25 (!) 111/59   24 115/80   23 100/71     Pulse Readings from Last 3 Encounters:   25 (!) 105   24 79   23 81       1. \"Have you been to the ER, urgent care clinic since your last visit?  Hospitalized since your last visit?\" No    2. \"Have you seen or consulted any other health care providers outside of the [facility] since your last visit?\" No     3. For patients aged 45-75: Has the patient had a colonoscopy / FIT/ Cologuard? Yes - Care Gap present. Most recent result on file    Patient is accompanied by self I have received verbal consent from Abe Gutierrez to discuss any/all medical information while they are present in the room.

## 2025-05-28 NOTE — PROGRESS NOTES
Abe Gutierrez is a 60 y.o. male  Presenting for his annual checkup and health maintenance review and follow-up  He is considering retiring this year.    Obesity.  Managed by endocrinology.  He has been on a regimen of Zepbound 2.5 since 01/2025, which has effectively reduced his weight from 280 pounds to 231 pounds.   He reports no adverse effects such as nausea or constipation.   Wt Readings from Last 3 Encounters:   05/28/25 104.8 kg (231 lb)   05/24/24 125.1 kg (275 lb 12.8 oz)   04/12/23 116.3 kg (256 lb 6.4 oz)     Hypogonadism.    Managed by endocrinology Dr. Romero  He is also on testosterone therapy, which he finds beneficial.   He notes a decrease in mood and physical energy when he misses a dose. He is scheduled for lab work at [facility] today.    He has not had any issues with heart palpitations or atrial fibrillation.  Taking metoprolol as prescribed.    Hypertension  Hypertension ROS: taking medications as instructed, no medication side effects noted, no TIA's, no chest pain on exertion, no dyspnea on exertion, no swelling of ankles     reports that he has never smoked. He has never used smokeless tobacco.    reports no history of alcohol use.   BP Readings from Last 2 Encounters:   05/28/25 (!) 111/59   05/24/24 115/80     Chronic insomnia, PTSD.  Has been taking zolpidem one third of one half of 10 mg zolpidem at bedtime for quite some time with good results.  Denies side effects.  Denies any significant alcohol intake    He has a history of moles, which were evaluated by a dermatologist approximately 3 years ago and deemed non-concerning. He has not observed any new moles since then.    He has been experiencing rosacea, which has been active recently. He is interested in trying a topical treatment for it. He was previously prescribed a topical cream for this condition, but it did not seem to be effective.    He reports no respiratory symptoms such as wheezing or coughing. His heartburn is well-managed,

## 2025-05-29 ENCOUNTER — PATIENT MESSAGE (OUTPATIENT)
Facility: CLINIC | Age: 60
End: 2025-05-29

## 2025-05-30 ENCOUNTER — PATIENT MESSAGE (OUTPATIENT)
Facility: CLINIC | Age: 60
End: 2025-05-30

## 2025-06-14 DIAGNOSIS — L71.9 ROSACEA: Primary | ICD-10-CM

## 2025-06-16 RX ORDER — MINOCYCLINE HYDROCHLORIDE 50 MG/1
CAPSULE ORAL DAILY
Qty: 30 CAPSULE | Refills: 5 | Status: SHIPPED | OUTPATIENT
Start: 2025-06-16

## 2025-08-13 DIAGNOSIS — F51.01 PRIMARY INSOMNIA: Primary | ICD-10-CM

## 2025-08-13 RX ORDER — ZOLPIDEM TARTRATE 10 MG/1
10 TABLET ORAL NIGHTLY PRN
Qty: 90 TABLET | Refills: 1 | Status: SHIPPED | OUTPATIENT
Start: 2025-08-13 | End: 2026-02-09